# Patient Record
Sex: MALE | Race: WHITE | NOT HISPANIC OR LATINO | Employment: OTHER | URBAN - METROPOLITAN AREA
[De-identification: names, ages, dates, MRNs, and addresses within clinical notes are randomized per-mention and may not be internally consistent; named-entity substitution may affect disease eponyms.]

---

## 2023-08-31 ENCOUNTER — LAB (OUTPATIENT)
Dept: LAB | Facility: LAB | Age: 73
End: 2023-08-31
Payer: MEDICARE

## 2023-08-31 LAB
ALANINE AMINOTRANSFERASE (SGPT) (U/L) IN SER/PLAS: 10 U/L (ref 10–52)
ALBUMIN (G/DL) IN SER/PLAS: 4 G/DL (ref 3.4–5)
ALKALINE PHOSPHATASE (U/L) IN SER/PLAS: 56 U/L (ref 33–136)
ANION GAP IN SER/PLAS: 13 MMOL/L (ref 10–20)
APPEARANCE, URINE: CLEAR
ASPARTATE AMINOTRANSFERASE (SGOT) (U/L) IN SER/PLAS: 18 U/L (ref 9–39)
BASOPHILS (10*3/UL) IN BLOOD BY AUTOMATED COUNT: 0.04 X10E9/L (ref 0–0.1)
BASOPHILS/100 LEUKOCYTES IN BLOOD BY AUTOMATED COUNT: 0.7 % (ref 0–2)
BILIRUBIN TOTAL (MG/DL) IN SER/PLAS: 1.2 MG/DL (ref 0–1.2)
BILIRUBIN, URINE: NEGATIVE
BLOOD, URINE: NEGATIVE
C. DIFFICILE TOXIN, PCR: NOT DETECTED
CALCIUM (MG/DL) IN SER/PLAS: 9.3 MG/DL (ref 8.6–10.6)
CARBON DIOXIDE, TOTAL (MMOL/L) IN SER/PLAS: 25 MMOL/L (ref 21–32)
CHLORIDE (MMOL/L) IN SER/PLAS: 105 MMOL/L (ref 98–107)
CHOLESTEROL (MG/DL) IN SER/PLAS: 202 MG/DL (ref 0–199)
CHOLESTEROL IN HDL (MG/DL) IN SER/PLAS: 59.1 MG/DL
CHOLESTEROL/HDL RATIO: 3.4
COLOR, URINE: YELLOW
CREATININE (MG/DL) IN SER/PLAS: 0.78 MG/DL (ref 0.5–1.3)
EOSINOPHILS (10*3/UL) IN BLOOD BY AUTOMATED COUNT: 0.27 X10E9/L (ref 0–0.4)
EOSINOPHILS/100 LEUKOCYTES IN BLOOD BY AUTOMATED COUNT: 4.7 % (ref 0–6)
ERYTHROCYTE DISTRIBUTION WIDTH (RATIO) BY AUTOMATED COUNT: 13.2 % (ref 11.5–14.5)
ERYTHROCYTE MEAN CORPUSCULAR HEMOGLOBIN CONCENTRATION (G/DL) BY AUTOMATED: 34.7 G/DL (ref 32–36)
ERYTHROCYTE MEAN CORPUSCULAR VOLUME (FL) BY AUTOMATED COUNT: 93 FL (ref 80–100)
ERYTHROCYTES (10*6/UL) IN BLOOD BY AUTOMATED COUNT: 4.8 X10E12/L (ref 4.5–5.9)
GFR MALE: >90 ML/MIN/1.73M2
GLUCOSE (MG/DL) IN SER/PLAS: 88 MG/DL (ref 74–99)
GLUCOSE, URINE: NEGATIVE MG/DL
HEMATOCRIT (%) IN BLOOD BY AUTOMATED COUNT: 44.7 % (ref 41–52)
HEMOGLOBIN (G/DL) IN BLOOD: 15.5 G/DL (ref 13.5–17.5)
IMMATURE GRANULOCYTES/100 LEUKOCYTES IN BLOOD BY AUTOMATED COUNT: 0.2 % (ref 0–0.9)
KETONES, URINE: NEGATIVE MG/DL
LDL: 123 MG/DL (ref 0–99)
LEUKOCYTE ESTERASE, URINE: NEGATIVE
LEUKOCYTES (10*3/UL) IN BLOOD BY AUTOMATED COUNT: 5.8 X10E9/L (ref 4.4–11.3)
LYMPHOCYTES (10*3/UL) IN BLOOD BY AUTOMATED COUNT: 1.26 X10E9/L (ref 0.8–3)
LYMPHOCYTES/100 LEUKOCYTES IN BLOOD BY AUTOMATED COUNT: 21.9 % (ref 13–44)
MONOCYTES (10*3/UL) IN BLOOD BY AUTOMATED COUNT: 0.5 X10E9/L (ref 0.05–0.8)
MONOCYTES/100 LEUKOCYTES IN BLOOD BY AUTOMATED COUNT: 8.7 % (ref 2–10)
NEUTROPHILS (10*3/UL) IN BLOOD BY AUTOMATED COUNT: 3.67 X10E9/L (ref 1.6–5.5)
NEUTROPHILS/100 LEUKOCYTES IN BLOOD BY AUTOMATED COUNT: 63.8 % (ref 40–80)
NITRITE, URINE: NEGATIVE
NRBC (PER 100 WBCS) BY AUTOMATED COUNT: 0 /100 WBC (ref 0–0)
PH, URINE: 7 (ref 5–8)
PLATELETS (10*3/UL) IN BLOOD AUTOMATED COUNT: 231 X10E9/L (ref 150–450)
POTASSIUM (MMOL/L) IN SER/PLAS: 4.3 MMOL/L (ref 3.5–5.3)
PROSTATE SPECIFIC ANTIGEN,SCREEN: <0.1 NG/ML (ref 0–4)
PROTEIN TOTAL: 6.6 G/DL (ref 6.4–8.2)
PROTEIN, URINE: NEGATIVE MG/DL
SODIUM (MMOL/L) IN SER/PLAS: 139 MMOL/L (ref 136–145)
SPECIFIC GRAVITY, URINE: 1.01 (ref 1–1.03)
TRIGLYCERIDE (MG/DL) IN SER/PLAS: 102 MG/DL (ref 0–149)
UREA NITROGEN (MG/DL) IN SER/PLAS: 13 MG/DL (ref 6–23)
UROBILINOGEN, URINE: <2 MG/DL (ref 0–1.9)
VLDL: 20 MG/DL (ref 0–40)

## 2023-10-09 ENCOUNTER — TELEPHONE (OUTPATIENT)
Dept: PRIMARY CARE | Facility: CLINIC | Age: 73
End: 2023-10-09
Payer: MEDICARE

## 2024-01-09 DIAGNOSIS — B35.3 TINEA PEDIS OF BOTH FEET: Primary | ICD-10-CM

## 2024-01-09 RX ORDER — TRIAMCINOLONE ACETONIDE 1 MG/G
CREAM TOPICAL
COMMUNITY
Start: 2020-10-19

## 2024-01-09 RX ORDER — FLUOROURACIL 50 MG/G
CREAM TOPICAL
COMMUNITY
Start: 2020-10-19

## 2024-01-09 RX ORDER — HYDROCODONE BITARTRATE AND HOMATROPINE METHYLBROMIDE ORAL SOLUTION 5; 1.5 MG/5ML; MG/5ML
5 LIQUID ORAL EVERY 4 HOURS PRN
COMMUNITY
Start: 2014-12-20

## 2024-01-09 RX ORDER — AMOXICILLIN 500 MG/1
CAPSULE ORAL
COMMUNITY
Start: 2023-08-16

## 2024-01-09 RX ORDER — IBUPROFEN 800 MG/1
TABLET ORAL
COMMUNITY
Start: 2023-08-16

## 2024-01-16 ENCOUNTER — OFFICE VISIT (OUTPATIENT)
Dept: PRIMARY CARE | Facility: CLINIC | Age: 74
End: 2024-01-16
Payer: MEDICARE

## 2024-01-16 ENCOUNTER — HOSPITAL ENCOUNTER (OUTPATIENT)
Dept: RADIOLOGY | Facility: HOSPITAL | Age: 74
Discharge: HOME | End: 2024-01-16
Payer: MEDICARE

## 2024-01-16 VITALS
BODY MASS INDEX: 23.95 KG/M2 | WEIGHT: 158 LBS | HEIGHT: 68 IN | TEMPERATURE: 96.3 F | DIASTOLIC BLOOD PRESSURE: 76 MMHG | SYSTOLIC BLOOD PRESSURE: 126 MMHG | OXYGEN SATURATION: 98 %

## 2024-01-16 DIAGNOSIS — C61 MALIGNANT NEOPLASM OF PROSTATE (MULTI): ICD-10-CM

## 2024-01-16 DIAGNOSIS — M54.31 SCIATICA OF RIGHT SIDE: ICD-10-CM

## 2024-01-16 DIAGNOSIS — Z00.00 WELL ADULT EXAM: ICD-10-CM

## 2024-01-16 DIAGNOSIS — F33.0 MAJOR DEPRESSIVE DISORDER, RECURRENT EPISODE, MILD (CMS-HCC): Primary | ICD-10-CM

## 2024-01-16 PROCEDURE — 1036F TOBACCO NON-USER: CPT | Performed by: INTERNAL MEDICINE

## 2024-01-16 PROCEDURE — 99213 OFFICE O/P EST LOW 20 MIN: CPT | Performed by: INTERNAL MEDICINE

## 2024-01-16 PROCEDURE — 72110 X-RAY EXAM L-2 SPINE 4/>VWS: CPT

## 2024-01-16 PROCEDURE — 72110 X-RAY EXAM L-2 SPINE 4/>VWS: CPT | Performed by: RADIOLOGY

## 2024-01-16 RX ORDER — METHYLPREDNISOLONE 4 MG/1
TABLET ORAL
Qty: 21 TABLET | Refills: 0 | Status: SHIPPED | OUTPATIENT
Start: 2024-01-16 | End: 2024-01-23

## 2024-01-16 ASSESSMENT — ENCOUNTER SYMPTOMS
LOSS OF SENSATION IN FEET: 0
LEG PAIN: 1
OCCASIONAL FEELINGS OF UNSTEADINESS: 0
DEPRESSION: 0

## 2024-01-16 ASSESSMENT — PATIENT HEALTH QUESTIONNAIRE - PHQ9
2. FEELING DOWN, DEPRESSED OR HOPELESS: NOT AT ALL
1. LITTLE INTEREST OR PLEASURE IN DOING THINGS: NOT AT ALL
SUM OF ALL RESPONSES TO PHQ9 QUESTIONS 1 AND 2: 0

## 2024-01-16 NOTE — PROGRESS NOTES
"Subjective   Patient ID: Amaury Sawyer is a 73 y.o. male who presents for Leg Pain (Right).    Leg Pain      74 yo wm c/o pain right lowe back,buttock and leg since 11/23 after driving back  from [place]    Patient exercises lifetime fitness yoga also uses a sun and swimming pool  Review of Systems   Genitourinary:         Prostate cancer followed by me   Patient complaining of toenail fungus right great toe using Lamisil cream we went over treatment options including Lamisil pills there are complications we will continue to use the cream we also talked briefly about laser therapy    Objective   /76 (BP Location: Right arm, Patient Position: Sitting)   Temp 35.7 °C (96.3 °F)   Ht 1.727 m (5' 8\")   Wt 71.7 kg (158 lb)   SpO2 98%   BMI 24.02 kg/m²     Physical Exam  Constitutional:       Appearance: He is normal weight.   Cardiovascular:      Pulses: Normal pulses.   Skin:     Comments: Thickening and discoloration right great toenail   Neurological:      Sensory: No sensory deficit.      Motor: Weakness (Normal walking on toes and heels) present.      Coordination: Coordination normal.      Gait: Gait normal.      Deep Tendon Reflexes: Reflexes normal.         Assessment/Plan   Diagnoses and all orders for this visit:  Major depressive disorder, recurrent episode, mild (CMS/HCC)  Malignant neoplasm of prostate (CMS/HCC)    PSA up-to-date    Sciatica of right side  -     XR lumbar spine 2-3 views; Future  -     methylPREDNISolone (Medrol Dospak) 4 mg tablets; Take as directed on package.  -     Referral to Physical Therapy; Future  Well adult exam  -     Colonoscopy Screening; Average Risk Patient; Future  Onychomycosis right great toenail continue with your topical Lamisil although I do not think it is going to help very much.  We talked about treatment options    Follow-up with me 6 weeks       "

## 2024-02-01 NOTE — PROGRESS NOTES
Physical Therapy  Physical Therapy Orthopedic Evaluation    Patient Name: Amaury Sawyer  MRN: [id number]  Today's Date: 2/5/2024  Time Calculation  Start Time: 0745  Stop Time: 0830  Time Calculation (min): 45 min    Referring Physician: Dr. Ben Amezquita  Visit #: 1 of MN  Medicare cert dates: 2/5/24-5/5/24  Insurance: [facility], auth needed, $20 copay    Current Problem  1. Lumbar radiculopathy, right  Follow Up In Physical Therapy      2. Sciatica of right side  Referral to Physical Therapy          Medical history form reviewed: Yes  DOI: 11/20/23    Subjective:   Patient with complaint of Right LBP with referred pain to buttock and R LE. Noticed pain after a long car ride from [employer]. Treated with steroid pack  in January with good results. About one week ago, lifting a chair and pain returned for a quick moment.  Currently: min. Ache R glut with no radiation         Precautions  STEADI Fall Risk Score (The score of 4 or more indicates an increased risk of falling): 0  Precautions Comment: Hx of cancer, no electrical modalities  Pain:  Pain Score: 2    Pain Exacerbating Factors: sometimes with driving, prolonged sitting    Pain Relieving Factors: move around, stand up    Imaging completed: X-ray: DJD lumbar spine    Exercise: yoga, swim ([employer])    Patient Goals for Treatment: decrease pain    Work Status: stained glass studio: some lifting, standing  Current status (improving, unchanged, worsening): improving    Current Level of Function: 90%    Patient aware of diagnosis and prognosis: Yes    Living Environment: apt.  Stairs: 1 floor(15 steps)  Social Support: Lives with lives alone    Language: English  Medical History Form: Reviewed (scanned into chart)          Objective:  Posture: unremarkable  Palpation: no tenderness to palpation lumbar spine  Gait: normal  Balance: normal  Trendelenburg: negative  L-AROM: WNL with no radiation  Repeat flexion/extension: no change  LE Strength: WNL B  Core  strength: 4  Sensation: intact  Slump test: negative  LE Flexibility: min tight B hamstring, piriformis  Kg test: + tight, no pain  Scour test: negative  Lumbar spinal mobility: WNL        Outcome Measures:  Other Measures  Oswestry Disablity Index (JASBIR): 13/50             EDUCATION: home exercise program, plan of care, activity modifications, pain management, and injury pathology       Goals:  Active       PT Problem       STG       Start:  02/05/24    Expected End:  05/05/24       STG's to be achieved in 4 weeks    1. Decrease R back/glut pain 50% with activity  2. Decrease  Oswestry by 3 points to help improve ADL's  3. Increase hip/core strength 1/2 muscle grade to help improve endurance  4. Improve flexibility hamstring/piriformis to assist with ADL's  5. Demonstrate proper posture with exercise           LTG       Start:  02/05/24    Expected End:  05/05/24       LTG's to be achieved in 8 weeks    1. Decrease R back/glut pain to tolerable with activity  2. Decrease Oswestry by 6 points to help improve ADL's  3. Increase strength hip/core  1 muscle grade to help improve endurance  4. Patient able to sit 1 hour with 50% less pain  5. Patient to be independent in daily HEP                      Treatments:   Patient instructed in HEP.   Access Code: [email or web address]  URL: [email or web address]  Date: 02/05/2024  Prepared by: Zeina Carroll    Exercises  - piriformis stretch seated  - 2-3 x daily - 7 x weekly - 2 reps - 20 hold  - hamstring stretch standing  - 2-3 x daily - 7 x weekly - 3 sets - 2 reps - 20 hold  Patient provided with written HEP.      Assessment: Patient is a 73 y.o. y/o male  with complaint of R back pain with occasional radicular pain R LE. Patient presents with min. Tightness LE and weak core. Pt would benefit from physical therapy to address the impairments found & listed previously in the objective section in order to return to safe and pain-free ADLs and prior level of  function.       Plan:   Rehab Potential: Good  Plan of Care Agreement: Patient  Planned Interventions include: therapeutic exercise, self-care home management, manual therapy, therapeutic activities, gait training, neuromuscular coordination  Frequency: 1x/wk  Duration: 8wks    Charges: eval-low, 1 TE      Zeina Carroll, PT, OCS

## 2024-02-04 ENCOUNTER — HOSPITAL ENCOUNTER (EMERGENCY)
Facility: HOSPITAL | Age: 74
Discharge: HOME | End: 2024-02-04
Attending: GENERAL PRACTICE
Payer: MEDICARE

## 2024-02-04 ENCOUNTER — APPOINTMENT (OUTPATIENT)
Dept: RADIOLOGY | Facility: HOSPITAL | Age: 74
End: 2024-02-04
Payer: MEDICARE

## 2024-02-04 ENCOUNTER — APPOINTMENT (OUTPATIENT)
Dept: CARDIOLOGY | Facility: HOSPITAL | Age: 74
End: 2024-02-04
Payer: MEDICARE

## 2024-02-04 VITALS
SYSTOLIC BLOOD PRESSURE: 159 MMHG | HEART RATE: 61 BPM | TEMPERATURE: 98.8 F | DIASTOLIC BLOOD PRESSURE: 56 MMHG | OXYGEN SATURATION: 98 % | BODY MASS INDEX: 23.95 KG/M2 | HEIGHT: 68 IN | WEIGHT: 158 LBS | RESPIRATION RATE: 17 BRPM

## 2024-02-04 DIAGNOSIS — M54.12 CERVICAL RADICULOPATHY: ICD-10-CM

## 2024-02-04 DIAGNOSIS — I71.21 ANEURYSM OF ASCENDING AORTA WITHOUT RUPTURE (CMS-HCC): Primary | ICD-10-CM

## 2024-02-04 LAB
ALBUMIN SERPL BCP-MCNC: 3.6 G/DL (ref 3.4–5)
ALP SERPL-CCNC: 55 U/L (ref 33–136)
ALT SERPL W P-5'-P-CCNC: 9 U/L (ref 10–52)
ANION GAP SERPL CALC-SCNC: 12 MMOL/L (ref 10–20)
AST SERPL W P-5'-P-CCNC: 18 U/L (ref 9–39)
BASOPHILS # BLD AUTO: 0.04 X10*3/UL (ref 0–0.1)
BASOPHILS NFR BLD AUTO: 0.5 %
BILIRUB SERPL-MCNC: 0.8 MG/DL (ref 0–1.2)
BUN SERPL-MCNC: 18 MG/DL (ref 6–23)
CA-I BLD-SCNC: 1.14 MMOL/L (ref 1.1–1.33)
CALCIUM SERPL-MCNC: 8.6 MG/DL (ref 8.6–10.3)
CARDIAC TROPONIN I PNL SERPL HS: 22 NG/L (ref 0–20)
CARDIAC TROPONIN I PNL SERPL HS: 23 NG/L (ref 0–20)
CHLORIDE SERPL-SCNC: 105 MMOL/L (ref 98–107)
CO2 SERPL-SCNC: 23 MMOL/L (ref 21–32)
CREAT SERPL-MCNC: 0.83 MG/DL (ref 0.5–1.3)
EGFRCR SERPLBLD CKD-EPI 2021: >90 ML/MIN/1.73M*2
EOSINOPHIL # BLD AUTO: 0.15 X10*3/UL (ref 0–0.4)
EOSINOPHIL NFR BLD AUTO: 1.8 %
ERYTHROCYTE [DISTWIDTH] IN BLOOD BY AUTOMATED COUNT: 12.9 % (ref 11.5–14.5)
GLUCOSE SERPL-MCNC: 129 MG/DL (ref 74–99)
HCT VFR BLD AUTO: 43.1 % (ref 41–52)
HGB BLD-MCNC: 14.7 G/DL (ref 13.5–17.5)
HOLD SPECIMEN: NORMAL
IMM GRANULOCYTES # BLD AUTO: 0.02 X10*3/UL (ref 0–0.5)
IMM GRANULOCYTES NFR BLD AUTO: 0.2 % (ref 0–0.9)
LYMPHOCYTES # BLD AUTO: 1.23 X10*3/UL (ref 0.8–3)
LYMPHOCYTES NFR BLD AUTO: 14.7 %
MAGNESIUM SERPL-MCNC: 2 MG/DL (ref 1.6–2.4)
MCH RBC QN AUTO: 31.3 PG (ref 26–34)
MCHC RBC AUTO-ENTMCNC: 34.1 G/DL (ref 32–36)
MCV RBC AUTO: 92 FL (ref 80–100)
MONOCYTES # BLD AUTO: 0.51 X10*3/UL (ref 0.05–0.8)
MONOCYTES NFR BLD AUTO: 6.1 %
NEUTROPHILS # BLD AUTO: 6.4 X10*3/UL (ref 1.6–5.5)
NEUTROPHILS NFR BLD AUTO: 76.7 %
NRBC BLD-RTO: 0 /100 WBCS (ref 0–0)
PLATELET # BLD AUTO: 202 X10*3/UL (ref 150–450)
POTASSIUM SERPL-SCNC: 3.9 MMOL/L (ref 3.5–5.3)
PROT SERPL-MCNC: 6.1 G/DL (ref 6.4–8.2)
RBC # BLD AUTO: 4.7 X10*6/UL (ref 4.5–5.9)
SODIUM SERPL-SCNC: 136 MMOL/L (ref 136–145)
WBC # BLD AUTO: 8.4 X10*3/UL (ref 4.4–11.3)

## 2024-02-04 PROCEDURE — 70450 CT HEAD/BRAIN W/O DYE: CPT | Performed by: RADIOLOGY

## 2024-02-04 PROCEDURE — 82330 ASSAY OF CALCIUM: CPT | Performed by: STUDENT IN AN ORGANIZED HEALTH CARE EDUCATION/TRAINING PROGRAM

## 2024-02-04 PROCEDURE — 72125 CT NECK SPINE W/O DYE: CPT | Performed by: RADIOLOGY

## 2024-02-04 PROCEDURE — 2550000001 HC RX 255 CONTRASTS: Performed by: GENERAL PRACTICE

## 2024-02-04 PROCEDURE — 83735 ASSAY OF MAGNESIUM: CPT | Performed by: STUDENT IN AN ORGANIZED HEALTH CARE EDUCATION/TRAINING PROGRAM

## 2024-02-04 PROCEDURE — 70450 CT HEAD/BRAIN W/O DYE: CPT

## 2024-02-04 PROCEDURE — 85025 COMPLETE CBC W/AUTO DIFF WBC: CPT | Performed by: GENERAL PRACTICE

## 2024-02-04 PROCEDURE — 84484 ASSAY OF TROPONIN QUANT: CPT | Performed by: GENERAL PRACTICE

## 2024-02-04 PROCEDURE — 93005 ELECTROCARDIOGRAM TRACING: CPT

## 2024-02-04 PROCEDURE — 71046 X-RAY EXAM CHEST 2 VIEWS: CPT

## 2024-02-04 PROCEDURE — 36415 COLL VENOUS BLD VENIPUNCTURE: CPT | Performed by: GENERAL PRACTICE

## 2024-02-04 PROCEDURE — 71275 CT ANGIOGRAPHY CHEST: CPT

## 2024-02-04 PROCEDURE — 36415 COLL VENOUS BLD VENIPUNCTURE: CPT | Performed by: EMERGENCY MEDICINE

## 2024-02-04 PROCEDURE — 72125 CT NECK SPINE W/O DYE: CPT

## 2024-02-04 PROCEDURE — 71046 X-RAY EXAM CHEST 2 VIEWS: CPT | Performed by: STUDENT IN AN ORGANIZED HEALTH CARE EDUCATION/TRAINING PROGRAM

## 2024-02-04 PROCEDURE — 80053 COMPREHEN METABOLIC PANEL: CPT | Performed by: GENERAL PRACTICE

## 2024-02-04 PROCEDURE — 85025 COMPLETE CBC W/AUTO DIFF WBC: CPT | Performed by: EMERGENCY MEDICINE

## 2024-02-04 PROCEDURE — 99285 EMERGENCY DEPT VISIT HI MDM: CPT | Mod: 25 | Performed by: GENERAL PRACTICE

## 2024-02-04 PROCEDURE — 71275 CT ANGIOGRAPHY CHEST: CPT | Performed by: RADIOLOGY

## 2024-02-04 RX ADMIN — IOHEXOL 75 ML: 350 INJECTION, SOLUTION INTRAVENOUS at 17:30

## 2024-02-04 ASSESSMENT — COLUMBIA-SUICIDE SEVERITY RATING SCALE - C-SSRS
2. HAVE YOU ACTUALLY HAD ANY THOUGHTS OF KILLING YOURSELF?: NO
6. HAVE YOU EVER DONE ANYTHING, STARTED TO DO ANYTHING, OR PREPARED TO DO ANYTHING TO END YOUR LIFE?: NO
1. IN THE PAST MONTH, HAVE YOU WISHED YOU WERE DEAD OR WISHED YOU COULD GO TO SLEEP AND NOT WAKE UP?: NO

## 2024-02-04 ASSESSMENT — PAIN DESCRIPTION - DESCRIPTORS: DESCRIPTORS: DULL

## 2024-02-04 ASSESSMENT — PAIN SCALES - GENERAL: PAINLEVEL_OUTOF10: 4

## 2024-02-04 NOTE — ED PROVIDER NOTES
HPI:  Amaury Sawyer is a 73 y.o. with a history of HLD, presents emergency department complaining of right upper extremity numbness that started this morning, endorsing that it started in the tips of his fingers, rating up his arm.  Also endorse that he has been having neck pain, soreness since yesterday as well as a posterior headache.  He denies any recent trauma, nausea, vomiting.  States that at one point his numbness did radiate up to his right chest wall.,  Currently denies any chest pain, shortness of breath, recent fevers or chills.  Does state he has had a right shoulder replacement in the past, has had arthritis and intermittent numbness in his arm previously.  He denies any dizziness though does state he felt slightly lightheaded earlier today which is now resolved.     ------------------------------------------------------------------------------------------------------------------------------------------    Physical Exam:    ED Triage Vitals [02/04/24 1256]   Temperature Heart Rate Respirations BP   37.1 °C (98.8 °F) 64 16 112/75      Pulse Ox Temp Source Heart Rate Source Patient Position   96 % Temporal -- --      BP Location FiO2 (%)     -- --       Gen: Alert, comfortable appearing.  Sitting up in bed, in no acute distress.  Nontoxic.  Head/Neck: NCAT, neck w/ FROM.  Patient does have reproducible tingling in the right upper extremity with axial loading.  No midline C-spine tenderness, step-offs or deformities.  Eyes: EOMI, PERRL, anicteric sclerae, noninjected conjunctivae  Nose: Nares patent w/o rhinorrhea  Mouth:  MMM, no OP lesions noted  Heart: RRR, well perfused.  No anterior chest wall tenderness to palpation.  No overlying skin changes.  Lungs: CTA b/l no RRW, no increased work of breathing  Abdomen: soft, NT, ND, no rebound guarding or rigidity  Extremities: Warm, well perfused. Compartments soft, nontender to palpation.  Full range of motion.  Patient does have improvement in  paresthesias of the right upper extremity when abducting arm resting hand on head.   Neurologic: A&O x3, following all commands. CN II-XII intact. Diminished sensation along R C7 distribution, remaining upper and lower extremity strength and sensation intact.   Skin: warm, dry   Psychological: calm     ------------------------------------------------------------------------------------------------------------------------------------------    Medical Decision Making  73-year-old male with past medical history of hyperlipidemia, presenting to the emergency department complaining of right upper extremity paresthesias, headache and transient chest pain.  Vital signs are stable, patient is nontoxic.  Exam is as above.  Differential includes cervical radiculopathy versus dissection versus TIA/stroke versus electrolyte abnormality versus ACS.  Labs show no leukocytosis, CMP shows slightly elevated glucose, otherwise unremarkable, magnesium within normal limits, initial troponin 22, delta 23, ionized calcium within normal limits. Chest x-ray shows no acute pathology.  CT head, C-spine and CTA of the chest are pending. EKG, interpreted by me, shows normal sinus rhythm at 61 bpm, normal axis, no ST elevations or depressions, intervals are within normal limits, no STEMI.  Signed out to incoming team pending CTs, reevaluation.      Diagnoses as of 02/05/24 1426   Aneurysm of ascending aorta without rupture (CMS/HCC)   Cervical radiculopathy        Procedures       Clinical Impression: paresthesias of RUE     Dispo: pending upon signout to incoming team      Discussed with ED Attending, Dr. Fredrick Mims,    Emergency Medicine, PGY3      Azeem Mims,   Resident  02/04/24 1805       Azeem Mims DO  Resident  02/05/24 1426

## 2024-02-04 NOTE — ED TRIAGE NOTES
Pt coming in today because he eating breakfast where he started to feel numbness and tingling on the right shoulder the radiate towards the chest. Since then he feels better and not complaining of the same sensation. Pain started roughly 45 mins ago. Personally has not cardiac history but has a family history of stroke and heart attacks. Pt has no deficits and able to move all extremities equally.

## 2024-02-05 ENCOUNTER — EVALUATION (OUTPATIENT)
Dept: PHYSICAL THERAPY | Facility: CLINIC | Age: 74
End: 2024-02-05
Payer: MEDICARE

## 2024-02-05 DIAGNOSIS — M54.31 SCIATICA OF RIGHT SIDE: ICD-10-CM

## 2024-02-05 DIAGNOSIS — M54.16 LUMBAR RADICULOPATHY, RIGHT: Primary | ICD-10-CM

## 2024-02-05 PROCEDURE — 97110 THERAPEUTIC EXERCISES: CPT | Mod: GP

## 2024-02-05 PROCEDURE — 97161 PT EVAL LOW COMPLEX 20 MIN: CPT | Mod: GP

## 2024-02-05 ASSESSMENT — PAIN SCALES - GENERAL: PAINLEVEL_OUTOF10: 2

## 2024-02-05 ASSESSMENT — ENCOUNTER SYMPTOMS
DEPRESSION: 0
LOSS OF SENSATION IN FEET: 0
OCCASIONAL FEELINGS OF UNSTEADINESS: 0

## 2024-02-05 NOTE — DISCHARGE INSTRUCTIONS
You were seen in our hospital today and found with a incidental ascending aortic aneurysm.  We consulted with cardiac surgery on your behalf to coordinate outpatient follow-up.  Cardiac surgery hosts a clinic here at [facility] every Monday and we will try to get you scheduled for clinic next Monday.  Please be on the look out for a phone call or email from [facility].

## 2024-02-05 NOTE — PROGRESS NOTES
Patient was handed off to me from the previous team. For full history, physical, and prior ED course, please see previous provider note prior to patient handoff. This is an addendum to the record.    Briefly, this is a 73-year-old male with hyperlipidemia presenting to the emergency department with right upper extremity paresthesias headache and transient chest pain.  At time of signout, patient was pending CT imaging.  Troponin stable in the low 20s.  EKG nonischemic.  CT angio study shows a 4.7 cm aneurysm of the ascending aorta.  Appears stable on CT however given this incidental finding, cardiothoracic surgery was consulted and will coordinate outpatient follow-up for the cardiothoracic clinic here at [facility] on Mondays as no surgical intervention is indicated until the aneurysm is greater than 5 cm.  Patient remains hemodynamically stable and in no acute distress.  Discussed outpatient plan with patient as well as plan to follow-up with cardiac surgery.  Patient is agreeable.  Do not feel that further workup indicated at this time. Discussed results, diagnosis/differential, and plan with patient. Patient advised to follow up with primary physician in 2-3 days. Discussed return precautions and encouraged patient to return to the Emergency Department for any concerning symptoms or worsening condition. Patient expresses understanding and is in agreement. All questions answered. Patient discharged in stable condition.      Throughout the ED stay, the patient was monitored and re-examined for any changes in stability or symptomatology. The patient was instructed to return to the ED if any symptoms recurred, worsened, or if there was any additional concerns.    Pt seen and discussed with Dr. Fredrick Morataya DO.  Emergency Medicine PGY-2

## 2024-02-06 LAB
ATRIAL RATE: 61 BPM
P AXIS: 55 DEGREES
P OFFSET: 197 MS
P ONSET: 145 MS
PR INTERVAL: 152 MS
Q ONSET: 221 MS
QRS COUNT: 10 BEATS
QRS DURATION: 90 MS
QT INTERVAL: 416 MS
QTC CALCULATION(BAZETT): 418 MS
QTC FREDERICIA: 418 MS
R AXIS: 43 DEGREES
T AXIS: 48 DEGREES
T OFFSET: 429 MS
VENTRICULAR RATE: 61 BPM

## 2024-02-11 NOTE — PROGRESS NOTES
Physical Therapy  Physical Therapy Treatment    Patient Name: Amaury Sawyer  MRN: [id number]  Today's Date: 2/13/2024  Time Calculation  Start Time: 1630  Stop Time: 1715  Time Calculation (min): 45 min    Referring Physician: Dr. Ben Amezquita  Visit #: 2 of MN  Medicare cert dates: 2/5/24-5/5/24  Insurance: [facility], auth needed, $20 copay      Current Problem  1. Lumbar radiculopathy, right               Precautions  Precautions  Precautions Comment: Hx of cancer, no electrical modalities    Pain Score: 0 - No pain  Performing HEP: Yes      Subjective   Patient reports no symptoms today.      Objective   Gait: normal      Treatment:      Bike x5 min. L5  Passive stretching B hip flexor, hamstring, piriformis, TR  Supine GB heel slide x1'  Supine GB bridge x2'  Supine GB reverse curl x20  Green TB hip ER x2'  Green TB clam x1' R/L  GB seated march x1'  GB seated LAQ x1'  GB crunch 2x10  GB sit to bridge x5  GB swimmers x10 R/L  GB push up x5      Access Code: [email or web address]   Assessment:   Patient did very well with exercise. Patient able to make small adjustments when any discomfort. Improved flexibility R LE.       Plan:   Assess symptoms next session and make changes as needed. Progress with core stability.         Charges: 2 ex, 1 NME      Zeina Carroll, PT

## 2024-02-13 ENCOUNTER — TREATMENT (OUTPATIENT)
Dept: PHYSICAL THERAPY | Facility: CLINIC | Age: 74
End: 2024-02-13
Payer: MEDICARE

## 2024-02-13 DIAGNOSIS — M54.16 LUMBAR RADICULOPATHY, RIGHT: Primary | ICD-10-CM

## 2024-02-13 PROCEDURE — 97112 NEUROMUSCULAR REEDUCATION: CPT | Mod: GP

## 2024-02-13 PROCEDURE — 97110 THERAPEUTIC EXERCISES: CPT | Mod: GP

## 2024-02-13 ASSESSMENT — PAIN SCALES - GENERAL: PAINLEVEL_OUTOF10: 0 - NO PAIN

## 2024-02-20 ENCOUNTER — APPOINTMENT (OUTPATIENT)
Dept: GASTROENTEROLOGY | Facility: HOSPITAL | Age: 74
End: 2024-02-20
Payer: MEDICARE

## 2024-02-21 ENCOUNTER — TREATMENT (OUTPATIENT)
Dept: PHYSICAL THERAPY | Facility: CLINIC | Age: 74
End: 2024-02-21
Payer: MEDICARE

## 2024-02-21 DIAGNOSIS — M54.16 LUMBAR RADICULOPATHY, RIGHT: Primary | ICD-10-CM

## 2024-02-21 PROCEDURE — 97112 NEUROMUSCULAR REEDUCATION: CPT | Mod: GP,CQ | Performed by: SPECIALIST/TECHNOLOGIST

## 2024-02-21 PROCEDURE — 97110 THERAPEUTIC EXERCISES: CPT | Mod: GP,CQ | Performed by: SPECIALIST/TECHNOLOGIST

## 2024-02-21 ASSESSMENT — PAIN SCALES - GENERAL: PAINLEVEL_OUTOF10: 0 - NO PAIN

## 2024-02-21 NOTE — PROGRESS NOTES
"Physical Therapy  Physical Therapy Treatment    Patient Name: Amaury Sawyer  MRN: [id number]  Today's Date: 2/21/2024  Time Calculation  Start Time: 1025  Stop Time: 1112  Time Calculation (min): 47 min    Referring Physician: Dr. Ben Amezquita  Visit #: 3 of MN  Medicare cert dates: 2/5/24-5/5/24  Insurance: [facility], auth needed, $20 copay      Current Problem  1. 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      Objective   Gait: normal  No innominate  Pirif 0°, Hip ext 1/2\" Quads 10\"   MMT hip ext 3, hams 4+     Treatment:      Stepper L2 5 min   DBE 2x2 min   4 kg KB tandem & swing cw/ccw 10x R/L   8 kg KB ISO SB R/L 1'   Bravo ISO rot R/L 1' 7.5#   Hip ext R/L 55#/55# 20x  Hams 30# 20x  SS hams R/L 1'   Rolling pin massage R/L quads   SS pirif, HF, Quads 1' (standing and supine)       Access Code: [email or web address]   Assessment:   Patient tolerated intensity with minimal fatigue noting challenge of balance and core work.  Patient noted feeling good post treatment.  Patient reports better understanding post treatment.   Plan:   Continue to improve core stability/strength, anterior flexibility and balance to improve gait and ADL  Patient to schedule 2-3 sessions before March 21 re-check with Zeina Carroll, PT, OCS        Charges: 2 ex, 1 NME      Chase Sanchez PTA  "

## 2024-03-08 ENCOUNTER — TREATMENT (OUTPATIENT)
Dept: PHYSICAL THERAPY | Facility: CLINIC | Age: 74
End: 2024-03-08
Payer: MEDICARE

## 2024-03-08 DIAGNOSIS — M54.16 LUMBAR RADICULOPATHY, RIGHT: ICD-10-CM

## 2024-03-08 PROCEDURE — 97110 THERAPEUTIC EXERCISES: CPT | Mod: GP,CQ | Performed by: SPECIALIST/TECHNOLOGIST

## 2024-03-08 PROCEDURE — 97112 NEUROMUSCULAR REEDUCATION: CPT | Mod: GP,CQ | Performed by: SPECIALIST/TECHNOLOGIST

## 2024-03-08 ASSESSMENT — PAIN SCALES - GENERAL: PAINLEVEL_OUTOF10: 0 - NO PAIN

## 2024-03-08 NOTE — PROGRESS NOTES
"Physical Therapy  Physical Therapy Treatment    Patient Name: Amaury Sawyer  MRN: [id number]  Today's Date: 3/8/2024  Time Calculation  Start Time: 0740  Stop Time: 0825  Time Calculation (min): 45 min    Referring Physician: Dr. Ben Amezquita  Visit #: 3 of MN Medicare cert dates: 2/5/24-5/5/24  Insurance: [facility], auth needed, $20 copay      Current Problem  1. Lumbar radiculopathy, right  Follow Up In Physical Therapy          Reason for Referral: R Lumbar Radiculopathy  Referred By: JOHANNA Amezquita MD  Precautions  Precautions  STEADI Fall Risk Score (The score of 4 or more indicates an increased risk of falling): 0  Precautions Comment: Hx of cancer, no electrical modalities    Pain Score: 0 - No pain  Performing HEP: Yes      Subjective   Patient reports leg symptoms driving eliminated by purchase of tush cush.  Patient no longer having nerve pain down legs.  Patient noted B ankle soreness for about a day and a half after last session. Patient notes some general joint aches.      Objective   Gait: normal  No innominate  Pirif 0°, Hip ext 1/2\" Quads 10\"   MMT hip ext 3, hams 4+     Treatment:      Stepper L2 5 min   DBE 2x2 min   4 kg KB tandem & swing cw/ccw 10x R/L   8 kg KB ISO SB R/L 1'   Bravo ISO rot R/L 1' 10#   25# band lumbar extension 20x  Hip ext R/L 66#/66# 20x  Hams 30# 20x  SS hams R/L 1'   Rolling pin massage R/L quads   SS pirif, HF, Quads 1' (standing and supine)       Access Code: [email or web address]   Assessment:   Patient tolerated increased intensity with minimal fatigue noting challenge of balance.  Patient noted no symptoms post treatment.  Patient noted good effort of seated lumbar extension.  Patient reports better understanding post treatment.   Plan:   Continue to improve core stability/strength, anterior flexibility and balance to improve gait and ADL  Patient has 1 further session prior to March 21 re-check with Zeina Carroll, PT, OCS        Charges: 2 ex, 1 NME      Chase Sanchez, " PTA

## 2024-03-13 ENCOUNTER — TREATMENT (OUTPATIENT)
Dept: PHYSICAL THERAPY | Facility: CLINIC | Age: 74
End: 2024-03-13
Payer: MEDICARE

## 2024-03-13 DIAGNOSIS — M54.16 LUMBAR RADICULOPATHY, RIGHT: ICD-10-CM

## 2024-03-13 PROCEDURE — 97112 NEUROMUSCULAR REEDUCATION: CPT | Mod: GP,CQ | Performed by: SPECIALIST/TECHNOLOGIST

## 2024-03-13 PROCEDURE — 97110 THERAPEUTIC EXERCISES: CPT | Mod: GP,CQ | Performed by: SPECIALIST/TECHNOLOGIST

## 2024-03-13 ASSESSMENT — PAIN SCALES - GENERAL: PAINLEVEL_OUTOF10: 0 - NO PAIN

## 2024-03-13 NOTE — PROGRESS NOTES
"Physical Therapy  Physical Therapy Treatment    Patient Name: Amaury Sawyer  MRN: [id number]  Today's Date: 3/13/2024  Time Calculation  Start Time: 0940  Stop Time: 1028  Time Calculation (min): 48 min    Referring Physician: Dr. Ben Amezquita  Visit #: 5 of MN  Medicare cert dates: 2/5/24-5/5/24  Insurance: [facility], auth needed, $20 copay      Current Problem  1. Lumbar radiculopathy, right  Follow Up In Physical Therapy          Reason for Referral: R Lumbar Radiculopathy  Referred By: JOHANNA Amezquita MD  Precautions  Precautions  STEADI Fall Risk Score (The score of 4 or more indicates an increased risk of falling): 0  Precautions Comment: Hx of cancer, no electrical modalities    Pain Score: 0 - No pain  Performing HEP: Yes      Subjective   Patient arrived full weight bearing noting no back pain on arrival.  Patient notes mild R knee pain on arrival.  Patient notes feeling much better overall without any soreness after last session.      Objective   Gait: normal  No innominate  Pirif 0°, Hip ext 1/2\" Quads 10\"   MMT hip ext 3, hams 4+     Treatment:      Stepper L2 5 min   DBE 2x2 min   4 kg KB tandem & swing cw/ccw 10x R/L   8 kg KB ISO SB R/L 1'   Bravo ISO rot R/L 1' 10#   25# band lumbar extension 20x  Hip ext R/L 77#/77# 20x  Hams 30# 2x20  SS hams R/L 1'   Rolling pin massage R/L quads   SS pirif, HF, Quads 1' (standing and supine)        Access Code: [email or web address]   Assessment:   Patient tolerated increased intensity without problems.  Patient noted good effort with seated lumbar extension.  Patient still challenged by balance.   Plan:   Continue to improve core stability/strength, anterior flexibility and balance to improve gait and ADL  Patient has re-check with Zeina Carroll, PT, OCS on March 21.       Charges: 2 ex, 1 NME      Chase Sanchez, PTA  "

## 2024-03-18 NOTE — PROGRESS NOTES
Physical Therapy  Physical Therapy Orthopedic Progress Note    Patient Name: Amaury Sawyer  MRN: [id number]  Today's Date: 3/21/2024  Time Calculation  Start Time: 0915  Stop Time: 1000  Time Calculation (min): 45 min    Referring Physician: Dr. Ben Amezquita  Visit #: 6 of MN  Medicare cert dates: 2/5/24-5/5/24  Insurance: [facility], auth needed, $20 copay      Current Problem  1. Lumbar radiculopathy, right  Follow Up In Physical Therapy             Precautions:  Precautions  Precautions Comment: Hx of cancer, no electrical modalities        Subjective   Patient reports min pain in buttock R with no radicular pain    Current Condition:   better     PAIN  Pain Score: 3    Self Reported Function (0-100%) = 90%      Objective   Core strength: 4+  LE Flexibility: min tight B hamstring, piriformis  Kg test: WNL      Outcome Measures: Updated 3/21/2024  Other Measures  Oswestry Disablity Index (JASBIR): 9/50           Treatments:    Nustep x8 min. L2   Re-assess goals  Review and perform HEP  25# band lumbar extension 20x  DBE 2x2 min   4 kg KB tandem & swing cw/ccw 15x R/L   8 kg KB farmers carry around room x1 R/L  Bravo trunk rotation 10# x10 R/L  Bravo lat pull down 20# 2x10     Access Code: [email or web address]     Assessment:   Patient has made improvements with core strength and flexibility. I feel patient is ready to continue on his own with HEP.     Goals: Updated 3/21/2024  Resolved       PT Problem       STG (Adequate for Discharge)       Start:  02/05/24    Expected End:  05/05/24    Resolved:  03/21/24    STG's to be achieved in 4 weeks    1. Decrease R back/glut pain 50% with activity- goal achieved  2. Decrease  Oswestry by 3 points to help improve ADL's- goal achieved  3. Increase hip/core strength 1/2 muscle grade to help improve endurance- goal achieved  4. Improve flexibility hamstring/piriformis to assist with ADL's- goal achieved  5. Demonstrate proper posture with exercise- goal achieved           LTG  (Adequate for Discharge)       Start:  02/05/24    Expected End:  05/05/24    Resolved:  03/21/24    LTG's to be achieved in 8 weeks    1. Decrease R back/glut pain to tolerable with activity- in progress  2. Decrease Oswestry by 6 points to help improve ADL's- in progress  3. Increase strength hip/core  1 muscle grade to help improve endurance- in progress  4. Patient able to sit 1 hour with 50% less pain- in progress  5. Patient to be independent in daily HEP - goal achieved               Plan of Care: Updated 3/21/2024   Discharge PT. Encourage continuation of HEP for best results.     Charges: 2 TE, 1 NME    Zeina Carroll, PT, OCS

## 2024-03-20 DIAGNOSIS — Z12.11 COLON CANCER SCREENING: Primary | ICD-10-CM

## 2024-03-20 RX ORDER — POLYETHYLENE GLYCOL 3350 17 G/17G
POWDER, FOR SOLUTION ORAL
Qty: 238 G | Refills: 0 | Status: SHIPPED | OUTPATIENT
Start: 2024-03-20 | End: 2024-03-26 | Stop reason: ALTCHOICE

## 2024-03-21 ENCOUNTER — TREATMENT (OUTPATIENT)
Dept: PHYSICAL THERAPY | Facility: CLINIC | Age: 74
End: 2024-03-21
Payer: MEDICARE

## 2024-03-21 DIAGNOSIS — M54.16 LUMBAR RADICULOPATHY, RIGHT: ICD-10-CM

## 2024-03-21 PROCEDURE — 97110 THERAPEUTIC EXERCISES: CPT | Mod: GP

## 2024-03-21 PROCEDURE — 97112 NEUROMUSCULAR REEDUCATION: CPT | Mod: GP

## 2024-03-21 ASSESSMENT — PAIN SCALES - GENERAL: PAINLEVEL_OUTOF10: 3

## 2024-03-25 ENCOUNTER — HOSPITAL ENCOUNTER (OUTPATIENT)
Dept: GASTROENTEROLOGY | Facility: HOSPITAL | Age: 74
Setting detail: OUTPATIENT SURGERY
Discharge: HOME | End: 2024-03-25
Payer: MEDICARE

## 2024-03-25 VITALS
SYSTOLIC BLOOD PRESSURE: 111 MMHG | OXYGEN SATURATION: 96 % | HEART RATE: 58 BPM | RESPIRATION RATE: 16 BRPM | TEMPERATURE: 96.8 F | DIASTOLIC BLOOD PRESSURE: 81 MMHG

## 2024-03-25 DIAGNOSIS — Z00.00 WELL ADULT EXAM: Primary | ICD-10-CM

## 2024-03-25 DIAGNOSIS — Z86.010 HISTORY OF COLON POLYPS: ICD-10-CM

## 2024-03-25 PROCEDURE — G0500 MOD SEDAT ENDO SERVICE >5YRS: HCPCS | Performed by: INTERNAL MEDICINE

## 2024-03-25 PROCEDURE — 7100000010 HC PHASE TWO TIME - EACH INCREMENTAL 1 MINUTE: Performed by: INTERNAL MEDICINE

## 2024-03-25 PROCEDURE — 45385 COLONOSCOPY W/LESION REMOVAL: CPT | Performed by: INTERNAL MEDICINE

## 2024-03-25 PROCEDURE — 88305 TISSUE EXAM BY PATHOLOGIST: CPT | Mod: TC,SUR | Performed by: INTERNAL MEDICINE

## 2024-03-25 PROCEDURE — 7100000009 HC PHASE TWO TIME - INITIAL BASE CHARGE: Performed by: INTERNAL MEDICINE

## 2024-03-25 PROCEDURE — 3700000012 HC SEDATION LEVEL 5+ TIME - INITIAL 15 MINUTES 5/> YEARS: Performed by: INTERNAL MEDICINE

## 2024-03-25 PROCEDURE — 3700000013 HC SEDATION LEVEL 5+ TIME - EACH ADDITIONAL 15 MINUTES: Performed by: INTERNAL MEDICINE

## 2024-03-25 PROCEDURE — 2500000004 HC RX 250 GENERAL PHARMACY W/ HCPCS (ALT 636 FOR OP/ED): Performed by: INTERNAL MEDICINE

## 2024-03-25 PROCEDURE — 88305 TISSUE EXAM BY PATHOLOGIST: CPT | Performed by: PATHOLOGY

## 2024-03-25 PROCEDURE — 99153 MOD SED SAME PHYS/QHP EA: CPT | Performed by: INTERNAL MEDICINE

## 2024-03-25 RX ORDER — SODIUM CHLORIDE, SODIUM LACTATE, POTASSIUM CHLORIDE, CALCIUM CHLORIDE 600; 310; 30; 20 MG/100ML; MG/100ML; MG/100ML; MG/100ML
20 INJECTION, SOLUTION INTRAVENOUS CONTINUOUS
Status: DISCONTINUED | OUTPATIENT
Start: 2024-03-25 | End: 2024-03-26 | Stop reason: HOSPADM

## 2024-03-25 RX ORDER — FENTANYL CITRATE 50 UG/ML
INJECTION, SOLUTION INTRAMUSCULAR; INTRAVENOUS AS NEEDED
Status: COMPLETED | OUTPATIENT
Start: 2024-03-25 | End: 2024-03-25

## 2024-03-25 RX ORDER — MIDAZOLAM HYDROCHLORIDE 1 MG/ML
INJECTION, SOLUTION INTRAMUSCULAR; INTRAVENOUS AS NEEDED
Status: COMPLETED | OUTPATIENT
Start: 2024-03-25 | End: 2024-03-25

## 2024-03-25 RX ADMIN — FENTANYL CITRATE 50 MCG: 50 INJECTION, SOLUTION INTRAMUSCULAR; INTRAVENOUS at 15:07

## 2024-03-25 RX ADMIN — MIDAZOLAM 2 MG: 1 INJECTION INTRAMUSCULAR; INTRAVENOUS at 15:07

## 2024-03-25 RX ADMIN — MIDAZOLAM 1 MG: 1 INJECTION INTRAMUSCULAR; INTRAVENOUS at 15:09

## 2024-03-25 RX ADMIN — FENTANYL CITRATE 25 MCG: 50 INJECTION, SOLUTION INTRAMUSCULAR; INTRAVENOUS at 15:09

## 2024-03-25 ASSESSMENT — PAIN - FUNCTIONAL ASSESSMENT
PAIN_FUNCTIONAL_ASSESSMENT: 0-10

## 2024-03-25 ASSESSMENT — COLUMBIA-SUICIDE SEVERITY RATING SCALE - C-SSRS: 1. IN THE PAST MONTH, HAVE YOU WISHED YOU WERE DEAD OR WISHED YOU COULD GO TO SLEEP AND NOT WAKE UP?: NO

## 2024-03-25 ASSESSMENT — PAIN SCALES - GENERAL
PAINLEVEL_OUTOF10: 0 - NO PAIN

## 2024-03-25 NOTE — H&P
Subjective     History of Present Illness:   Amaury Sawyer is a 74 y.o. male with history of colon polyps and prostate cancer who presented for surveillance colonoscopy.  Last colonoscopy in 2017 showed hemorrhoids, erythematous mucosa in the rectum consistent with changes related to prior radiation therapy, two ascending colon tubular adenomas, and sigmoid diverticulosis.  Patient denies having any GI symptoms.    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), Other specific arthropathies, not elsewhere classified, right shoulder (05/28/2019), Other specific arthropathies, not elsewhere classified, right shoulder (04/10/2019), Other specified depressive episodes (01/08/2020), Other specified disorders of eye and adnexa (08/14/2020), Other specified soft tissue disorders (05/29/2019), Other specified soft tissue disorders (07/05/2019), Pain in right shoulder (07/01/2019), Pain in unspecified knee (08/28/2019), Personal history of other endocrine, nutritional and metabolic disease (06/28/2018), Personal history of other infectious and parasitic diseases (08/28/2019), Personal history of other specified conditions (06/19/2018), Personal history of other specified conditions (08/14/2020), Personal history of other specified conditions (07/05/2019), Personal history of other specified conditions (05/21/2015), Phlebitis and thrombophlebitis of other  sites (08/28/2019), Sciatica, right side (12/20/2017), Tinea unguium (08/14/2020), Unspecified acute conjunctivitis, left eye (08/04/2020), and Unspecified fall, initial encounter (10/06/2020).     Social History   reports that he has never smoked. He has never used smokeless tobacco.     Family History  family history is not on file.     Allergies  No Known Allergies    Medications  Current Outpatient Medications   Medication Instructions    amoxicillin (Amoxil) 500 mg capsule     fluorouracil (Efudex) 5 % cream 1 Application    hydrocodone-homatropine 5-1.5 mg/5 mL syrup 5 mL, oral, Every 4 hours PRN    ibuprofen 800 mg tablet     polyethylene glycol (Glycolax, Miralax) 17 gram/dose powder Mix 238 gms with 2 32 ounce bottles of Gatorade or Powerade, Drink per bowel prep instructions.    tadalafil (Cialis) 10 mg tablet TAKE 1 TABLET BY MOUTH 1 HOUR BEFORE ACTIVITY AS NEEDED    triamcinolone (Kenalog) 0.1 % cream 1 Application        Objective   Visit Vitals  /89   Pulse 53   Temp 36 °C (96.8 °F) (Temporal)   Resp 15        Physical Exam  General: not in acute distress  CV: regular rate and rhythm  Resp: non-labored breathing  GI: soft, active bowel sounds, non-tender to palpation, no rebound or guarding  Msk: moving all extremities   Derm: no jaundice    Labs  Lab Results   Component Value Date    WBC 8.4 02/04/2024    HGB 14.7 02/04/2024    HCT 43.1 02/04/2024    MCV 92 02/04/2024     02/04/2024     Lab Results   Component Value Date    GLUCOSE 129 (H) 02/04/2024    CALCIUM 8.6 02/04/2024     02/04/2024    K 3.9 02/04/2024    CO2 23 02/04/2024     02/04/2024    BUN 18 02/04/2024    CREATININE 0.83 02/04/2024     Lab Results   Component Value Date    ALT 9 (L) 02/04/2024    AST 18 02/04/2024    ALKPHOS 55 02/04/2024    BILITOT 0.8 02/04/2024     Lab Results   Component Value Date    INR 1.0 12/18/2022    INR 0.9 04/25/2019    PROTIME 11.4 12/18/2022    PROTIME 10.4 04/25/2019        Assessment/Plan   Amaury Sawyer is a 74 y.o. male with history of colon polyps and prostate cancer who presented for surveillance colonoscopy.  Last colonoscopy in 2017 showed hemorrhoids, erythematous mucosa in the rectum consistent with changes related to prior radiation therapy, two ascending colon tubular adenomas, and sigmoid diverticulosis.  Patient denies having any GI symptoms.        Matthew Luis MD

## 2024-03-25 NOTE — DISCHARGE INSTRUCTIONS
During the first 24 hours after your procedure, you should:    - Resume normal diet, unless otherwise directed by your doctor.  - Resume your home medications, unless otherwise directed by your doctor.  - Refrain from driving or operative heavy machinery.  - Drink plenty of liquids.  - Avoid consuming alcohol.  - Avoid strenuous activity or heavy lifting.    After 24 hours, you can resume regular activity.    Call your doctor office immediately ([phone]) or come to the nearest emergency room if you experience:    - Abdominal tenderness  - Blood in your stool or vomit  - Difficulty urinating or passing stools  - Difficulty breathing  - Chest pain  - Fever

## 2024-03-26 PROBLEM — S22.39XA CLOSED FRACTURE OF ONE RIB: Status: RESOLVED | Noted: 2024-03-26 | Resolved: 2024-03-26

## 2024-03-26 PROBLEM — N52.9 MALE ERECTILE DISORDER: Status: RESOLVED | Noted: 2024-03-26 | Resolved: 2024-03-26

## 2024-03-26 PROBLEM — S06.0XAA CONCUSSION: Status: RESOLVED | Noted: 2022-12-20 | Resolved: 2024-03-26

## 2024-03-26 PROBLEM — D64.9 ANEMIA: Status: RESOLVED | Noted: 2024-03-26 | Resolved: 2024-03-26

## 2024-03-26 PROBLEM — B37.9 YEAST DETECTED: Status: RESOLVED | Noted: 2024-03-26 | Resolved: 2024-03-26

## 2024-03-26 PROBLEM — L57.9 SKIN CHANGES DUE TO CHRONIC EXPOSURE TO NONIONIZING RADIATION, UNSPECIFIED: Status: RESOLVED | Noted: 2020-10-12 | Resolved: 2024-03-26
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
## 2024-03-26 PROBLEM — L57.0 ACTINIC KERATOSIS OF SCALP: Status: RESOLVED | Noted: 2017-11-13 | Resolved: 2024-03-26

## 2024-03-26 PROBLEM — R51.9 HEADACHE: Status: RESOLVED | Noted: 2024-03-26 | Resolved: 2024-03-26

## 2024-03-26 PROBLEM — D22.5 MELANOCYTIC NEVI OF TRUNK: Status: RESOLVED | Noted: 2020-10-12 | Resolved: 2024-03-26

## 2024-03-26 PROBLEM — M19.90 UNSPECIFIED OSTEOARTHRITIS, UNSPECIFIED SITE: Status: RESOLVED | Noted: 2022-12-20 | Resolved: 2024-03-26

## 2024-03-26 PROBLEM — C44.41 BASAL CELL CARCINOMA (BCC) OF SCALP: Status: RESOLVED | Noted: 2023-02-21 | Resolved: 2024-03-26

## 2024-03-26 PROBLEM — R63.5 WEIGHT GAIN: Status: RESOLVED | Noted: 2024-03-26 | Resolved: 2024-03-26

## 2024-03-26 PROBLEM — E78.5 HYPERLIPIDEMIA: Status: RESOLVED | Noted: 2024-03-26 | Resolved: 2024-03-26

## 2024-03-26 PROBLEM — L82.1 OTHER SEBORRHEIC KERATOSIS: Status: RESOLVED | Noted: 2017-11-13 | Resolved: 2024-03-26

## 2024-03-26 PROBLEM — D22.39 MELANOCYTIC NEVI OF OTHER PARTS OF FACE: Status: RESOLVED | Noted: 2022-08-18 | Resolved: 2024-03-26

## 2024-03-26 PROBLEM — I71.21 ASCENDING AORTIC ANEURYSM (CMS-HCC): Status: ACTIVE | Noted: 2024-03-26

## 2024-03-26 PROBLEM — K63.5 COLON POLYP: Status: RESOLVED | Noted: 2024-03-26 | Resolved: 2024-03-26

## 2024-03-26 PROBLEM — H52.4 PRESBYOPIA: Status: RESOLVED | Noted: 2024-03-26 | Resolved: 2024-03-26

## 2024-03-26 PROBLEM — H57.89 INFLAMMATORY DISORDER OF EYE: Status: RESOLVED | Noted: 2024-03-26 | Resolved: 2024-03-26

## 2024-03-26 PROBLEM — R55 SYNCOPE: Status: RESOLVED | Noted: 2024-03-26 | Resolved: 2024-03-26

## 2024-03-26 PROBLEM — E78.00 ELEVATED LDL CHOLESTEROL LEVEL: Status: RESOLVED | Noted: 2024-03-26 | Resolved: 2024-03-26

## 2024-03-26 PROBLEM — C61 PROSTATE CANCER (MULTI): Status: RESOLVED | Noted: 2022-12-20 | Resolved: 2024-03-26

## 2024-03-26 PROBLEM — D18.01 HEMANGIOMA OF SKIN AND SUBCUTANEOUS TISSUE: Status: RESOLVED | Noted: 2022-08-18 | Resolved: 2024-03-26

## 2024-03-26 PROBLEM — M79.642 PAIN IN LEFT HAND: Status: RESOLVED | Noted: 2023-08-30 | Resolved: 2024-03-26

## 2024-03-26 PROBLEM — M12.811 ROTATOR CUFF ARTHROPATHY OF RIGHT SHOULDER: Status: RESOLVED | Noted: 2024-03-26 | Resolved: 2024-03-26

## 2024-03-26 PROBLEM — E55.9 VITAMIN D DEFICIENCY: Status: RESOLVED | Noted: 2024-03-26 | Resolved: 2024-03-26

## 2024-03-26 PROBLEM — D22.60 MELANOCYTIC NEVI OF UNSPECIFIED UPPER LIMB, INCLUDING SHOULDER: Status: RESOLVED | Noted: 2022-08-18 | Resolved: 2024-03-26

## 2024-03-26 PROBLEM — C44.519 BASAL CELL CARCINOMA OF SKIN OF OTHER PART OF TRUNK: Status: RESOLVED | Noted: 2023-05-09 | Resolved: 2024-03-26

## 2024-03-26 PROBLEM — I80.8 SUPERFICIAL THROMBOPHLEBITIS OF RIGHT UPPER EXTREMITY: Status: RESOLVED | Noted: 2024-03-26 | Resolved: 2024-03-26

## 2024-03-26 PROBLEM — D48.5 NEOPLASM OF UNCERTAIN BEHAVIOR OF SKIN: Status: RESOLVED | Noted: 2020-10-12 | Resolved: 2024-03-26

## 2024-03-26 PROBLEM — L81.4 OTHER MELANIN HYPERPIGMENTATION: Status: RESOLVED | Noted: 2020-10-12 | Resolved: 2024-03-26

## 2024-03-26 PROBLEM — D22.70 MELANOCYTIC NEVI OF UNSPECIFIED LOWER LIMB, INCLUDING HIP: Status: RESOLVED | Noted: 2022-08-18 | Resolved: 2024-03-26

## 2024-03-26 PROBLEM — E78.5 HYPERLIPIDEMIA: Status: RESOLVED | Noted: 2022-12-20 | Resolved: 2024-03-26
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
## 2024-03-26 PROBLEM — K57.32 DIVERTICULITIS OF COLON: Status: RESOLVED | Noted: 2024-03-26 | Resolved: 2024-03-26

## 2024-03-26 PROBLEM — D04.5 CARCINOMA IN SITU OF SKIN OF TRUNK: Status: RESOLVED | Noted: 2023-05-09 | Resolved: 2024-03-26

## 2024-03-26 RX ORDER — FLUCONAZOLE 200 MG/1
200 TABLET ORAL DAILY
COMMUNITY
Start: 2020-02-12

## 2024-03-26 RX ORDER — MULTIVITAMIN
1 TABLET ORAL DAILY
COMMUNITY

## 2024-03-26 RX ORDER — VENLAFAXINE 37.5 MG/1
37.5 TABLET ORAL
COMMUNITY
Start: 2019-11-12

## 2024-03-26 RX ORDER — CHOLECALCIFEROL (VITAMIN D3) 50 MCG
2000 TABLET ORAL DAILY
COMMUNITY
Start: 2018-06-28

## 2024-03-26 RX ORDER — SILDENAFIL 50 MG/1
50 TABLET, FILM COATED ORAL AS NEEDED
COMMUNITY
Start: 2020-07-22

## 2024-03-26 RX ORDER — FLUOXETINE HYDROCHLORIDE 20 MG/1
20 CAPSULE ORAL DAILY
COMMUNITY
Start: 2015-05-21

## 2024-03-27 ENCOUNTER — OFFICE VISIT (OUTPATIENT)
Dept: CARDIAC SURGERY | Facility: HOSPITAL | Age: 74
End: 2024-03-27
Payer: MEDICARE

## 2024-03-27 VITALS
HEIGHT: 69 IN | SYSTOLIC BLOOD PRESSURE: 111 MMHG | HEART RATE: 60 BPM | WEIGHT: 150 LBS | DIASTOLIC BLOOD PRESSURE: 70 MMHG | OXYGEN SATURATION: 98 % | BODY MASS INDEX: 22.22 KG/M2

## 2024-03-27 DIAGNOSIS — I79.0 ANEURYSM OF AORTA IN DISEASES CLASSIFIED ELSEWHERE (CMS-HCC): Primary | ICD-10-CM

## 2024-03-27 PROCEDURE — 1126F AMNT PAIN NOTED NONE PRSNT: CPT | Performed by: THORACIC SURGERY (CARDIOTHORACIC VASCULAR SURGERY)

## 2024-03-27 PROCEDURE — 1036F TOBACCO NON-USER: CPT | Performed by: THORACIC SURGERY (CARDIOTHORACIC VASCULAR SURGERY)

## 2024-03-27 PROCEDURE — 1159F MED LIST DOCD IN RCRD: CPT | Performed by: THORACIC SURGERY (CARDIOTHORACIC VASCULAR SURGERY)

## 2024-03-27 PROCEDURE — 99205 OFFICE O/P NEW HI 60 MIN: CPT | Performed by: THORACIC SURGERY (CARDIOTHORACIC VASCULAR SURGERY)

## 2024-03-27 PROCEDURE — 99215 OFFICE O/P EST HI 40 MIN: CPT | Performed by: THORACIC SURGERY (CARDIOTHORACIC VASCULAR SURGERY)

## 2024-03-27 ASSESSMENT — PAIN SCALES - GENERAL: PAINLEVEL: 0-NO PAIN

## 2024-03-27 NOTE — PROGRESS NOTES
This is a 74 years old male patient very active and very healthy otherwise.  He woke up with some numbness on right arm and he went to the emergency room.  He was worked up with a CT scan and he was found incidentally with a 5 cm ascending aorta.  So he was sent to me today for evaluation.  The patient has no significant history or family history of aortic disease.  He is actually pretty active today.  He is in very good shape.  I have evaluated his CT scan on a measure of 5 cm ascending aorta and also the length from the aortic annulus to the takeoff of the innominate trunk is 13 cm which put him on a high risk for rupture or any aortic complication.  I had a long conversation with the patient about recommending surgery.  I have explained the situation, risk and benefits we also talk about the procedure and the recovery time.  The patient agreed to proceed with an operation so we will schedule him for preoperative testing like coronary angiogram, echocardiogram and some others.  Soon as we finished with the preoperative study we will then schedule him for surgery.

## 2024-03-31 DIAGNOSIS — I71.21 AORTIC ROOT ANEURYSM (CMS-HCC): ICD-10-CM

## 2024-03-31 DIAGNOSIS — I35.0 NONRHEUMATIC AORTIC (VALVE) STENOSIS: ICD-10-CM

## 2024-03-31 DIAGNOSIS — T82.9XXA DISORDER OF PROSTHETIC AORTIC VALVE: ICD-10-CM

## 2024-03-31 DIAGNOSIS — I77.810 ASCENDING AORTA DILATION (CMS-HCC): ICD-10-CM

## 2024-04-01 ENCOUNTER — DOCUMENTATION (OUTPATIENT)
Dept: PRIMARY CARE | Facility: CLINIC | Age: 74
End: 2024-04-01
Payer: MEDICARE

## 2024-04-01 ENCOUNTER — HOSPITAL ENCOUNTER (OUTPATIENT)
Facility: HOSPITAL | Age: 74
Setting detail: SURGERY ADMIT
End: 2024-04-01
Attending: THORACIC SURGERY (CARDIOTHORACIC VASCULAR SURGERY) | Admitting: THORACIC SURGERY (CARDIOTHORACIC VASCULAR SURGERY)
Payer: MEDICARE

## 2024-04-01 DIAGNOSIS — I71.21 ASCENDING AORTIC ANEURYSM, UNSPECIFIED WHETHER RUPTURED (CMS-HCC): Primary | ICD-10-CM

## 2024-04-01 LAB
LABORATORY COMMENT REPORT: NORMAL
PATH REPORT.FINAL DX SPEC: NORMAL
PATH REPORT.GROSS SPEC: NORMAL
PATH REPORT.TOTAL CANCER: NORMAL

## 2024-04-01 NOTE — PROGRESS NOTES
I spoke with patient after reviewing the notes scans regarding his aortic aneurysm thoracic.  Patient will be getting a second opinion at [facility] which I think is a good idea.  I have asked him to call me after that visit

## 2024-04-02 ENCOUNTER — DOCUMENTATION (OUTPATIENT)
Dept: CARDIOLOGY | Facility: HOSPITAL | Age: 74
End: 2024-04-02
Payer: MEDICARE

## 2024-04-02 NOTE — PROGRESS NOTES
TITAN Registry eligibility criteria     Patient ID   : [id number]                           Site number [id number]    Inclusion Criteria   Patients >=18 years of age Patients with ascending aortic aneurysm >=5.0cm who are either not eligible or not willing to participate in the TITAN Randomized Controlled Trial     Exclusion Criteria   Patients who are unable to give informed consent    Patients who are unable to attend for regular follow-up/ remain compliant with protocol    Patients who will be undergoing aortic or cardiac surgery and have an ascending aortic aneurysm >=5.5cm       The above Subject has meet all the inclusion and Exclusion criteria.

## 2024-04-04 DIAGNOSIS — R68.89 ABNORMAL FINDING ON SCREENING PROCEDURE: Primary | ICD-10-CM

## 2024-04-04 DIAGNOSIS — K21.9 GASTROESOPHAGEAL REFLUX DISEASE WITHOUT ESOPHAGITIS: ICD-10-CM

## 2024-04-08 ENCOUNTER — HOSPITAL ENCOUNTER (OUTPATIENT)
Dept: CARDIOLOGY | Facility: HOSPITAL | Age: 74
Discharge: HOME | End: 2024-04-08
Payer: MEDICARE

## 2024-04-08 DIAGNOSIS — I71.21 ANEURYSM OF ASCENDING AORTA WITHOUT RUPTURE (CMS-HCC): Primary | ICD-10-CM

## 2024-04-08 DIAGNOSIS — I35.0 NONRHEUMATIC AORTIC (VALVE) STENOSIS: ICD-10-CM

## 2024-04-08 DIAGNOSIS — I77.810 ASCENDING AORTA DILATION (CMS-HCC): ICD-10-CM

## 2024-04-08 PROCEDURE — 2500000004 HC RX 250 GENERAL PHARMACY W/ HCPCS (ALT 636 FOR OP/ED): Performed by: STUDENT IN AN ORGANIZED HEALTH CARE EDUCATION/TRAINING PROGRAM

## 2024-04-08 PROCEDURE — 93306 TTE W/DOPPLER COMPLETE: CPT

## 2024-04-08 RX ADMIN — PERFLUTREN 2.5 ML OF DILUTION: 6.52 INJECTION, SUSPENSION INTRAVENOUS at 12:35

## 2024-04-09 LAB
AORTIC VALVE PEAK VELOCITY: 1.25 M/S
AV PEAK GRADIENT: 6.3 MMHG
EJECTION FRACTION APICAL 4 CHAMBER: 64.7
LEFT ATRIUM VOLUME AREA LENGTH INDEX BSA: 30.8 ML/M2
LEFT VENTRICLE INTERNAL DIMENSION DIASTOLE: 4.15 CM (ref 3.5–6)
LV EJECTION FRACTION BIPLANE: 69 %
MITRAL VALVE E/A RATIO: 0.7
MITRAL VALVE E/E' RATIO: 9.66
RIGHT VENTRICLE FREE WALL PEAK S': 14.5 CM/S
RIGHT VENTRICLE PEAK SYSTOLIC PRESSURE: 24 MMHG
TRICUSPID ANNULAR PLANE SYSTOLIC EXCURSION: 2.6 CM

## 2024-04-19 NOTE — RESULT ENCOUNTER NOTE
"Dear Amaury,    Of the 5 polyps that were resected during your recent colonoscopy, 4 polyps were precancerous adenomatous polyps and 1 \"polyp\" was benign mucosa.  I recommend repeat colonoscopy in 5 years if your health remains stable at that time.  Feel free to contact my office if you have any questions.    Sincerely,  Matthew Luis MD"

## 2024-05-07 ENCOUNTER — TELEPHONE (OUTPATIENT)
Dept: GASTROENTEROLOGY | Facility: HOSPITAL | Age: 74
End: 2024-05-07
Payer: MEDICARE

## 2024-05-07 NOTE — TELEPHONE ENCOUNTER
Left voicemail to remind patient to read his [employer] message regarding colonoscopy pathology results and contact my office if he has any questions.

## 2024-05-09 ENCOUNTER — HOSPITAL ENCOUNTER (OUTPATIENT)
Dept: GASTROENTEROLOGY | Facility: HOSPITAL | Age: 74
Setting detail: OUTPATIENT SURGERY
Discharge: HOME | End: 2024-05-09
Payer: MEDICARE

## 2024-05-09 VITALS
BODY MASS INDEX: 22.51 KG/M2 | HEART RATE: 56 BPM | SYSTOLIC BLOOD PRESSURE: 109 MMHG | RESPIRATION RATE: 15 BRPM | DIASTOLIC BLOOD PRESSURE: 77 MMHG | HEIGHT: 69 IN | TEMPERATURE: 98.1 F | WEIGHT: 152 LBS | OXYGEN SATURATION: 96 %

## 2024-05-09 DIAGNOSIS — R68.89 ABNORMAL FINDING ON SCREENING PROCEDURE: Primary | ICD-10-CM

## 2024-05-09 PROCEDURE — 2500000004 HC RX 250 GENERAL PHARMACY W/ HCPCS (ALT 636 FOR OP/ED): Performed by: INTERNAL MEDICINE

## 2024-05-09 PROCEDURE — 87900 PHENOTYPE INFECT AGENT DRUG: CPT | Performed by: INTERNAL MEDICINE

## 2024-05-09 PROCEDURE — 7100000009 HC PHASE TWO TIME - INITIAL BASE CHARGE

## 2024-05-09 PROCEDURE — 43239 EGD BIOPSY SINGLE/MULTIPLE: CPT | Performed by: INTERNAL MEDICINE

## 2024-05-09 PROCEDURE — 3700000012 HC SEDATION LEVEL 5+ TIME - INITIAL 15 MINUTES 5/> YEARS

## 2024-05-09 PROCEDURE — 7100000010 HC PHASE TWO TIME - EACH INCREMENTAL 1 MINUTE

## 2024-05-09 PROCEDURE — 88305 TISSUE EXAM BY PATHOLOGIST: CPT | Mod: TC,SUR | Performed by: INTERNAL MEDICINE

## 2024-05-09 RX ORDER — SODIUM CHLORIDE, SODIUM LACTATE, POTASSIUM CHLORIDE, CALCIUM CHLORIDE 600; 310; 30; 20 MG/100ML; MG/100ML; MG/100ML; MG/100ML
20 INJECTION, SOLUTION INTRAVENOUS CONTINUOUS
Status: DISCONTINUED | OUTPATIENT
Start: 2024-05-09 | End: 2024-05-10 | Stop reason: HOSPADM

## 2024-05-09 RX ORDER — MIDAZOLAM HYDROCHLORIDE 1 MG/ML
INJECTION, SOLUTION INTRAMUSCULAR; INTRAVENOUS AS NEEDED
Status: COMPLETED | OUTPATIENT
Start: 2024-05-09 | End: 2024-05-09

## 2024-05-09 RX ORDER — FENTANYL CITRATE 50 UG/ML
INJECTION, SOLUTION INTRAMUSCULAR; INTRAVENOUS AS NEEDED
Status: COMPLETED | OUTPATIENT
Start: 2024-05-09 | End: 2024-05-09

## 2024-05-09 RX ADMIN — MIDAZOLAM 1 MG: 1 INJECTION INTRAMUSCULAR; INTRAVENOUS at 15:53

## 2024-05-09 RX ADMIN — FENTANYL CITRATE 50 MCG: 50 INJECTION, SOLUTION INTRAMUSCULAR; INTRAVENOUS at 15:53

## 2024-05-09 ASSESSMENT — COLUMBIA-SUICIDE SEVERITY RATING SCALE - C-SSRS
1. IN THE PAST MONTH, HAVE YOU WISHED YOU WERE DEAD OR WISHED YOU COULD GO TO SLEEP AND NOT WAKE UP?: NO
6. HAVE YOU EVER DONE ANYTHING, STARTED TO DO ANYTHING, OR PREPARED TO DO ANYTHING TO END YOUR LIFE?: NO
2. HAVE YOU ACTUALLY HAD ANY THOUGHTS OF KILLING YOURSELF?: NO

## 2024-05-09 ASSESSMENT — PAIN - FUNCTIONAL ASSESSMENT
PAIN_FUNCTIONAL_ASSESSMENT: 0-10

## 2024-05-09 ASSESSMENT — PAIN SCALES - GENERAL
PAINLEVEL_OUTOF10: 0 - NO PAIN

## 2024-05-09 NOTE — NURSING NOTE
Eileen RN spoke with patient after procedure to discuss procedure results with him. Dr Gamez notified her that he would not be able to speak with pt and asked her to speak with him instead. Pt has no AVS at this time, but Eileen confirmed with him and me that she would send him AVS tomorrow. Pt education on sedation discharge instructions and verbalized understanding. Pt instructed to call tomorrow if AVS not received. Dr Gamez office number, GI suite phone number, and manager, Noa's phone number all provided to patient to call with any questions. Pt verbalized understanding and comfort with the situation.

## 2024-05-09 NOTE — H&P
Subjective     History of Present Illness:   Amaury Sawyer is a 74 y.o. male who presents to endoscopy    Physical Exam  General: not in acute distress  CV: regular rate and rhythm  Resp: non-labored breathing

## 2024-05-16 LAB
LAB AP ASR DISCLAIMER: NORMAL
LABORATORY COMMENT REPORT: NORMAL
PATH REPORT.COMMENTS IMP SPEC: NORMAL
PATH REPORT.FINAL DX SPEC: NORMAL
PATH REPORT.GROSS SPEC: NORMAL
PATH REPORT.TOTAL CANCER: NORMAL

## 2024-05-22 LAB
ELECTRONICALLY SIGNED BY: NORMAL
H. PYLORI DRUG SUSCEPTIBILITY RESULTS: NORMAL

## 2024-05-23 DIAGNOSIS — K29.70 HELICOBACTER POSITIVE GASTRITIS: Primary | ICD-10-CM

## 2024-05-23 DIAGNOSIS — B96.81 HELICOBACTER POSITIVE GASTRITIS: Primary | ICD-10-CM

## 2024-06-12 ENCOUNTER — TELEPHONE (OUTPATIENT)
Dept: PRIMARY CARE | Facility: CLINIC | Age: 74
End: 2024-06-12
Payer: MEDICARE

## 2024-06-18 ENCOUNTER — TELEPHONE (OUTPATIENT)
Dept: PRIMARY CARE | Facility: CLINIC | Age: 74
End: 2024-06-18
Payer: MEDICARE

## 2024-06-19 ENCOUNTER — OFFICE VISIT (OUTPATIENT)
Dept: PRIMARY CARE | Facility: CLINIC | Age: 74
End: 2024-06-19
Payer: MEDICARE

## 2024-06-19 VITALS
WEIGHT: 149.91 LBS | OXYGEN SATURATION: 98 % | DIASTOLIC BLOOD PRESSURE: 64 MMHG | HEART RATE: 50 BPM | BODY MASS INDEX: 22.14 KG/M2 | SYSTOLIC BLOOD PRESSURE: 110 MMHG

## 2024-06-19 DIAGNOSIS — F32.A DEPRESSION, UNSPECIFIED DEPRESSION TYPE: ICD-10-CM

## 2024-06-19 DIAGNOSIS — R53.83 FATIGUE, UNSPECIFIED TYPE: ICD-10-CM

## 2024-06-19 DIAGNOSIS — R19.7 DIARRHEA, UNSPECIFIED TYPE: Primary | ICD-10-CM

## 2024-06-19 ASSESSMENT — PATIENT HEALTH QUESTIONNAIRE - PHQ9
1. LITTLE INTEREST OR PLEASURE IN DOING THINGS: NOT AT ALL
SUM OF ALL RESPONSES TO PHQ9 QUESTIONS 1 AND 2: 0
2. FEELING DOWN, DEPRESSED OR HOPELESS: NOT AT ALL

## 2024-06-19 ASSESSMENT — ENCOUNTER SYMPTOMS
OCCASIONAL FEELINGS OF UNSTEADINESS: 0
LOSS OF SENSATION IN FEET: 0
DEPRESSION: 0

## 2024-06-19 NOTE — PROGRESS NOTES
Subjective   Patient ID: Amaury Sawyer is a 74 y.o. male who presents for fatigue. Started with a cough, headache and fatigue Friday. Covid test negative. No interventions.   Requesting referral for therapy, struggling with some depression. Has been on meds in the past, nothing worked. Prefers therapy route at this point. Also looking into meditation.   HPI     Review of Systems    Objective   /64   Pulse 50   Wt 68 kg (149 lb 14.6 oz)   SpO2 98%   BMI 22.14 kg/m²     Physical Exam  Constitutional:       Appearance: Normal appearance.   HENT:      Right Ear: Tympanic membrane, ear canal and external ear normal.      Left Ear: Tympanic membrane, ear canal and external ear normal.      Nose: Nose normal.      Mouth/Throat:      Mouth: Mucous membranes are moist.      Pharynx: Oropharynx is clear.   Eyes:      Conjunctiva/sclera: Conjunctivae normal.      Pupils: Pupils are equal, round, and reactive to light.   Cardiovascular:      Rate and Rhythm: Normal rate and regular rhythm.   Pulmonary:      Effort: Pulmonary effort is normal.   Neurological:      General: No focal deficit present.      Mental Status: He is alert and oriented to person, place, and time.   Psychiatric:         Mood and Affect: Mood normal.         Behavior: Behavior normal.         Assessment/Plan     Viral URI: recommend OTC mucinex DM. Advil/tylenol PRN headache. Increase rest and fluids. FU If symptoms not improved over the next week   Depression: referral to psychology as requested. Pt not interested in medication at this time.

## 2024-06-25 ENCOUNTER — APPOINTMENT (OUTPATIENT)
Dept: PRIMARY CARE | Facility: CLINIC | Age: 74
End: 2024-06-25
Payer: MEDICARE

## 2024-06-25 ENCOUNTER — LAB (OUTPATIENT)
Dept: LAB | Facility: LAB | Age: 74
End: 2024-06-25
Payer: MEDICARE

## 2024-06-25 VITALS
WEIGHT: 154 LBS | SYSTOLIC BLOOD PRESSURE: 138 MMHG | HEART RATE: 55 BPM | DIASTOLIC BLOOD PRESSURE: 91 MMHG | BODY MASS INDEX: 22.74 KG/M2 | OXYGEN SATURATION: 96 %

## 2024-06-25 DIAGNOSIS — I71.21 ANEURYSM OF ASCENDING AORTA WITHOUT RUPTURE (CMS-HCC): ICD-10-CM

## 2024-06-25 DIAGNOSIS — F33.9 EPISODE OF RECURRENT MAJOR DEPRESSIVE DISORDER, UNSPECIFIED DEPRESSION EPISODE SEVERITY (CMS-HCC): ICD-10-CM

## 2024-06-25 DIAGNOSIS — R53.83 OTHER FATIGUE: ICD-10-CM

## 2024-06-25 DIAGNOSIS — R53.83 OTHER FATIGUE: Primary | ICD-10-CM

## 2024-06-25 DIAGNOSIS — E03.9 HYPOTHYROIDISM, UNSPECIFIED TYPE: ICD-10-CM

## 2024-06-25 LAB — TSH SERPL-ACNC: 5.2 MIU/L (ref 0.44–3.98)

## 2024-06-25 ASSESSMENT — PATIENT HEALTH QUESTIONNAIRE - PHQ9
1. LITTLE INTEREST OR PLEASURE IN DOING THINGS: NOT AT ALL
SUM OF ALL RESPONSES TO PHQ9 QUESTIONS 1 AND 2: 2
2. FEELING DOWN, DEPRESSED OR HOPELESS: MORE THAN HALF THE DAYS
10. IF YOU CHECKED OFF ANY PROBLEMS, HOW DIFFICULT HAVE THESE PROBLEMS MADE IT FOR YOU TO DO YOUR WORK, TAKE CARE OF THINGS AT HOME, OR GET ALONG WITH OTHER PEOPLE: NOT DIFFICULT AT ALL

## 2024-06-25 ASSESSMENT — ENCOUNTER SYMPTOMS
OCCASIONAL FEELINGS OF UNSTEADINESS: 0
LOSS OF SENSATION IN FEET: 0
DEPRESSION: 0
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
## 2024-06-26 ENCOUNTER — TELEPHONE (OUTPATIENT)
Dept: GASTROENTEROLOGY | Facility: HOSPITAL | Age: 74
End: 2024-06-26
Payer: MEDICARE

## 2024-06-26 DIAGNOSIS — E03.9 HYPOTHYROIDISM, UNSPECIFIED TYPE: Primary | ICD-10-CM

## 2024-06-26 LAB — THYROPEROXIDASE AB SERPL-ACNC: 46 IU/ML

## 2024-06-27 RX ORDER — LEVOTHYROXINE SODIUM 25 UG/1
25 TABLET ORAL DAILY
Qty: 30 TABLET | Refills: 11 | Status: SHIPPED | OUTPATIENT
Start: 2024-06-27 | End: 2025-06-27

## 2024-06-28 ENCOUNTER — TELEPHONE (OUTPATIENT)
Dept: ENDOCRINOLOGY | Facility: CLINIC | Age: 74
End: 2024-06-28
Payer: MEDICARE

## 2024-07-01 DIAGNOSIS — K29.70 HELICOBACTER POSITIVE GASTRITIS: Primary | ICD-10-CM

## 2024-07-01 DIAGNOSIS — B96.81 HELICOBACTER POSITIVE GASTRITIS: Primary | ICD-10-CM

## 2024-07-01 RX ORDER — TETRACYCLINE HYDROCHLORIDE 500 MG/1
500 CAPSULE ORAL 4 TIMES DAILY
Qty: 56 CAPSULE | Refills: 0 | Status: SHIPPED | OUTPATIENT
Start: 2024-07-01 | End: 2024-07-15

## 2024-07-01 RX ORDER — METRONIDAZOLE 500 MG/1
250 TABLET ORAL 4 TIMES DAILY
Qty: 28 TABLET | Refills: 0 | Status: SHIPPED | OUTPATIENT
Start: 2024-07-01 | End: 2024-07-15

## 2024-07-01 RX ORDER — BISMUTH SUBSALICYLATE 262 MG/1
524 TABLET ORAL
Qty: 112 TABLET | Refills: 0 | Status: SHIPPED | OUTPATIENT
Start: 2024-07-01 | End: 2024-07-15

## 2024-07-05 ENCOUNTER — CLINICAL SUPPORT (OUTPATIENT)
Dept: SLEEP MEDICINE | Facility: CLINIC | Age: 74
End: 2024-07-05
Payer: MEDICARE

## 2024-07-05 ENCOUNTER — APPOINTMENT (OUTPATIENT)
Dept: BEHAVIORAL HEALTH | Facility: CLINIC | Age: 74
End: 2024-07-05
Payer: MEDICARE

## 2024-07-05 DIAGNOSIS — F41.9 ANXIETY: ICD-10-CM

## 2024-07-05 DIAGNOSIS — F32.A DEPRESSION, UNSPECIFIED DEPRESSION TYPE: ICD-10-CM

## 2024-07-05 DIAGNOSIS — R53.83 OTHER FATIGUE: ICD-10-CM

## 2024-07-05 DIAGNOSIS — F12.20 MARIJUANA DEPENDENCE (MULTI): ICD-10-CM

## 2024-07-05 NOTE — PROGRESS NOTES
Type of Study: HOME SLEEP STUDY - NOMAD     The patient received equipment and instructions for use of the [employer] [facility] Nomad HSAT device. The patient was instructed how to apply the effort belts, cannula, thermistor. It was also explained how the Nomad and oximeter components work.  The patient was asked to record their sleep for an 8-hour period.     The patient was informed of their responsibility for the device and acknowledged this by signing the HSAT device contract. The patient was asked to return the device on 7/8/2024 between the hours of 8:00 am-4:00 pm to the Sleep Center.     The patient was instructed to call 911 as usual for any medical- emergencies while at home.  The patient was also given a phone number for troubleshooting when using the device in case there were additional questions.
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
## 2024-07-10 ENCOUNTER — TELEPHONE (OUTPATIENT)
Dept: PRIMARY CARE | Facility: CLINIC | Age: 74
End: 2024-07-10
Payer: MEDICARE

## 2024-07-11 ENCOUNTER — DOCUMENTATION (OUTPATIENT)
Dept: PRIMARY CARE | Facility: CLINIC | Age: 74
End: 2024-07-11
Payer: MEDICARE

## 2024-07-11 NOTE — PROGRESS NOTES
I spoke with Severo he is now living at his studio he does not have a home is having trouble with a new phone.  He did get antibiotics from GI and is taking them now we talked about his abnormal sleep study and he will call and make an appointment with sleep medicine.  He is seeing mental health.  He did give me permission to speak with his son JOSSELIN

## 2024-07-19 ENCOUNTER — SOCIAL WORK (OUTPATIENT)
Dept: BEHAVIORAL HEALTH | Facility: CLINIC | Age: 74
End: 2024-07-19
Payer: MEDICARE

## 2024-07-19 ENCOUNTER — APPOINTMENT (OUTPATIENT)
Dept: BEHAVIORAL HEALTH | Facility: CLINIC | Age: 74
End: 2024-07-19
Payer: MEDICARE

## 2024-07-19 DIAGNOSIS — F12.20 MARIJUANA DEPENDENCE (MULTI): ICD-10-CM

## 2024-07-19 DIAGNOSIS — F33.9 EPISODE OF RECURRENT MAJOR DEPRESSIVE DISORDER, UNSPECIFIED DEPRESSION EPISODE SEVERITY (CMS-HCC): ICD-10-CM

## 2024-07-19 DIAGNOSIS — F31.9 BIPOLAR AND RELATED DISORDER (MULTI): ICD-10-CM

## 2024-07-19 DIAGNOSIS — F32.A DEPRESSION, UNSPECIFIED DEPRESSION TYPE: ICD-10-CM

## 2024-07-19 DIAGNOSIS — F41.9 ANXIETY: ICD-10-CM

## 2024-07-19 SDOH — ECONOMIC STABILITY: TRANSPORTATION INSECURITY
IN THE PAST 12 MONTHS, HAS LACK OF TRANSPORTATION KEPT YOU FROM MEETINGS, WORK, OR FROM GETTING THINGS NEEDED FOR DAILY LIVING?: NO

## 2024-07-19 SDOH — ECONOMIC STABILITY: INCOME INSECURITY: IN THE LAST 12 MONTHS, WAS THERE A TIME WHEN YOU WERE NOT ABLE TO PAY THE MORTGAGE OR RENT ON TIME?: NO

## 2024-07-19 SDOH — ECONOMIC STABILITY: FOOD INSECURITY: WITHIN THE PAST 12 MONTHS, THE FOOD YOU BOUGHT JUST DIDN'T LAST AND YOU DIDN'T HAVE MONEY TO GET MORE.: NEVER TRUE

## 2024-07-19 SDOH — ECONOMIC STABILITY: TRANSPORTATION INSECURITY
IN THE PAST 12 MONTHS, HAS THE LACK OF TRANSPORTATION KEPT YOU FROM MEDICAL APPOINTMENTS OR FROM GETTING MEDICATIONS?: NO

## 2024-07-19 SDOH — ECONOMIC STABILITY: FOOD INSECURITY: WITHIN THE PAST 12 MONTHS, YOU WORRIED THAT YOUR FOOD WOULD RUN OUT BEFORE YOU GOT MONEY TO BUY MORE.: NEVER TRUE

## 2024-07-19 ASSESSMENT — SOCIAL DETERMINANTS OF HEALTH (SDOH)
HOW OFTEN DO YOU GET TOGETHER WITH FRIENDS OR RELATIVES?: ONCE A WEEK
HOW OFTEN DO YOU ATTEND CHURCH OR RELIGIOUS SERVICES?: MORE THAN 4 TIMES PER YEAR
IN A TYPICAL WEEK, HOW MANY TIMES DO YOU TALK ON THE PHONE WITH FAMILY, FRIENDS, OR NEIGHBORS?: ONCE A WEEK
IN THE PAST 12 MONTHS, HAS THE ELECTRIC, GAS, OIL, OR WATER COMPANY THREATENED TO SHUT OFF SERVICE IN YOUR HOME?: NO
DO YOU BELONG TO ANY CLUBS OR ORGANIZATIONS SUCH AS CHURCH GROUPS UNIONS, FRATERNAL OR ATHLETIC GROUPS, OR SCHOOL GROUPS?: YES
HOW OFTEN DO YOU ATTEND CHURCH OR RELIGIOUS SERVICES?: MORE THAN 4 TIMES PER YEAR
HOW OFTEN DO YOU ATTENT MEETINGS OF THE CLUB OR ORGANIZATION YOU BELONG TO?: MORE THAN 4 TIMES PER YEAR
HOW OFTEN DO YOU ATTENT MEETINGS OF THE CLUB OR ORGANIZATION YOU BELONG TO?: MORE THAN 4 TIMES PER YEAR
DO YOU BELONG TO ANY CLUBS OR ORGANIZATIONS SUCH AS CHURCH GROUPS UNIONS, FRATERNAL OR ATHLETIC GROUPS, OR SCHOOL GROUPS?: YES
WITHIN THE LAST YEAR, HAVE YOU BEEN AFRAID OF YOUR PARTNER OR EX-PARTNER?: NO
IN A TYPICAL WEEK, HOW MANY TIMES DO YOU TALK ON THE PHONE WITH FAMILY, FRIENDS, OR NEIGHBORS?: ONCE A WEEK
WITHIN THE LAST YEAR, HAVE YOU BEEN HUMILIATED OR EMOTIONALLY ABUSED IN OTHER WAYS BY YOUR PARTNER OR EX-PARTNER?: NO
HOW HARD IS IT FOR YOU TO PAY FOR THE VERY BASICS LIKE FOOD, HOUSING, MEDICAL CARE, AND HEATING?: NOT HARD AT ALL
HOW OFTEN DO YOU GET TOGETHER WITH FRIENDS OR RELATIVES?: ONCE A WEEK
WITHIN THE LAST YEAR, HAVE YOU BEEN KICKED, HIT, SLAPPED, OR OTHERWISE PHYSICALLY HURT BY YOUR PARTNER OR EX-PARTNER?: NO
WITHIN THE LAST YEAR, HAVE TO BEEN RAPED OR FORCED TO HAVE ANY KIND OF SEXUAL ACTIVITY BY YOUR PARTNER OR EX-PARTNER?: NO

## 2024-07-19 ASSESSMENT — LIFESTYLE VARIABLES
HOW OFTEN DO YOU HAVE A DRINK CONTAINING ALCOHOL: 2-4 TIMES A MONTH
SKIP TO QUESTIONS 9-10: 1
HOW MANY STANDARD DRINKS CONTAINING ALCOHOL DO YOU HAVE ON A TYPICAL DAY: 1 OR 2
AUDIT-C TOTAL SCORE: 2
HOW OFTEN DO YOU HAVE SIX OR MORE DRINKS ON ONE OCCASION: NEVER

## 2024-07-19 NOTE — PSYCHOTHERAPY
"Staying at the [employer] studio on 2nd floor, no one knows  Son finds it upsetting - goal to get something permanent  To [place]  He's flexible, volunteered on hospital ship  He has other options if needed  Its easy this way, son doesn't know, likes the setting  [employer]    Seeking help - younger son depends on him to take care of dog  \"I could have this\", chose not to  His sons - see him a floating homeless person  No one wants to hear his feelings, told he lives in the past    Doesn't like who he is  Uses marijuana as an antidepressant  Raised 2 sons, worked at [employer] for 23 years  In environment w/ people have lots of money  Smart and rich people - make him uncomfortable    No antidepressants work  Normally docile, withdrawn  Sons don't like him smoking dope    Has a grandson    Values -  His sons and families - both in [facility] and 2 grandsons, Alfie Mcclendon in [place]  Being an artist    Oldest son is a   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 was was an artist, eccentric    Son is an artist  Proud of sons  Shares studio space    Goals::   1.Give up need to smoke marijuana    Open to [facility]  Used to teach at [employer]  Went to [place] or 2 weeks, travelled with the students  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 Sister denies    Agrees w/ referral to ARS program                                                                                        "

## 2024-07-19 NOTE — PROGRESS NOTES
"Therapy Session  Progress Note    Session Type: Virtual    Start Time: 9:00 am  End Time: 9:55 am    Appointment: Scheduled    Reason for Visit: Depression, Anxiety, Marijuana Dependence    Patient Symptoms/Interval History: Trying to curb some of his marijuana use, aware that his sons don't approve of this. Wonders why he has difficulty moving on from traumatic events in his past. Uses marijuana to feel better about himself, but realizes there are consequences in his family life. Values his relationships with his sons and grandsons. Questioning his purpose at this point of his life. Has a strong interest in spirituality. Has never been through a rehab program but is open to considering attending the ARS program. Currently \"homeless\" again and staying in his son's art studio, despite having money to get a place of his own.     Mental Status: Alert & Oriented x3    Functional Status: Moderate impairment    Interventions Provided: Values Exploration, Motivational Interviewing    Progress Made: Minimum    Response to Interventions: The patient was receptive to the interventions provided.    Action Plan/Homework: A referral will be made to the ARS program. The patient will be leaving for a month-long trip to [place] on August 2, but he would be interested in starting that process prior to leaving.    Treatment Plan: Use Solution-Focused Therapy to help the patient cope with his feelings of depression and marijuana dependence. Continue to encourage the patient to attend a rehab program.    Follow Up/Next Appointment: Follow up in 2 weeks.      MITUL Garcia, MILES    **This visit was performed using real-time telehealth tools, including an audio/video connection between the patient and this provider. The patient provided consent for the individual telemedicine service and understands the limitations of the visit.  "
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
## 2024-07-29 ENCOUNTER — APPOINTMENT (OUTPATIENT)
Dept: BEHAVIORAL HEALTH | Facility: CLINIC | Age: 74
End: 2024-07-29
Payer: MEDICARE

## 2024-07-31 ENCOUNTER — APPOINTMENT (OUTPATIENT)
Dept: BEHAVIORAL HEALTH | Facility: CLINIC | Age: 74
End: 2024-07-31
Payer: MEDICARE

## 2024-09-09 PROBLEM — I25.10 CORONARY ARTERY CALCIFICATION SEEN ON CT SCAN: Status: ACTIVE | Noted: 2024-04-12

## 2024-09-09 PROBLEM — H25.13 AGE-RELATED NUCLEAR CATARACT, BILATERAL: Status: ACTIVE | Noted: 2024-09-09

## 2024-09-09 PROBLEM — I77.810 AORTIC ROOT DILATION (CMS-HCC): Status: ACTIVE | Noted: 2024-03-26

## 2024-09-09 RX ORDER — TERBINAFINE HYDROCHLORIDE 250 MG/1
TABLET ORAL
COMMUNITY
Start: 2024-07-29

## 2024-09-17 PROBLEM — F41.8 MIXED ANXIETY AND DEPRESSIVE DISORDER: Status: ACTIVE | Noted: 2024-09-17

## 2024-09-24 ENCOUNTER — TELEMEDICINE (OUTPATIENT)
Dept: BEHAVIORAL HEALTH | Facility: CLINIC | Age: 74
End: 2024-09-24
Payer: MEDICARE

## 2024-09-24 DIAGNOSIS — F31.9 BIPOLAR AND RELATED DISORDER (MULTI): ICD-10-CM

## 2024-09-24 RX ORDER — LITHIUM CARBONATE 300 MG
300 TABLET ORAL NIGHTLY
Qty: 30 TABLET | Refills: 1 | Status: SHIPPED | OUTPATIENT
Start: 2024-09-24 | End: 2024-11-23

## 2024-09-24 ASSESSMENT — ENCOUNTER SYMPTOMS: DEPRESSED MOOD: 1

## 2024-09-24 NOTE — PROGRESS NOTES
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 fragments of tan, soft tissue                           aggregating to 1.0 x 0.2 x 0.2 cm.  The specimen is admixed with organic                           material.  The specimen is submitted in toto in one cassette.                          [facility]                                                    Summary of Cassettes:                          Label Site                          1 soft tissue                          2 organic material                          B: Received in formalin, labeled with the patient's name and hospital                           number and \"2, transverse biopsy\", are multiple fragments of tan, soft                           tissue aggregating to 1.2 x 0.4 x 0.2 cm. The specimen is submitted in                           toto in one cassette.                          [facility]                          C: Received in formalin, labeled with the patient's name and hospital                           number and \"3, sigmoid biopsy\", is a fragment of tan, soft tissue                           measuring 0.4 x 0.3 x 0.2 cm. The specimen is submitted in toto in one                           cassette.                          [facility]       Assessment/Plan   Psychiatric Risk Assessment  Violence Risk Assessment: none  Acute Risk of Harm to Others is Considered: low   Suicide Risk " Assessment: age > 65 yrs old and   Protective Factors against Suicide: adherence to  treatment, fear of suicide, moral objections to suicide, positive family relationships, and sense of responsibility toward family  Acute Risk of Harm to Self is Considered: low    Imminent Risk of Suicide or Serious Self-Injury: Low   Chronic Risk of Suicide of Serious Self-Injury: Low  Risk factors: Age, depression history and   Protective factors: Denies current suicidal ideation, denies history of suicide attempts , willingness to seek help and support , gender, access to a variety of clinical interventions , and receiving and engaged in care for mental, physical, and substance use disorders      Imminent Risk of Violence or Homicide: Low   Risk Factors: No significant risk factors identified on screening  Protective Factors: Lack of known history of harm to others , Lack of known history of violent ideation , and lack of known access to firearms.     Assessment & Plan  Bipolar and related disorder (Multi)    Orders:    lithium 300 mg tablet; Take 1 tablet (300 mg) by mouth once daily at bedtime.    Referral to Psychology; Future    Follow Up In Psychiatry; Future    Follow up in 2 to 3 weeks.   We did make a referral to psychology for psychotherapy.

## 2024-09-24 NOTE — ASSESSMENT & PLAN NOTE
Orders:    lithium 300 mg tablet; Take 1 tablet (300 mg) by mouth once daily at bedtime.    Referral to Psychology; Future    Follow Up In Psychiatry; Future

## 2024-09-27 ENCOUNTER — APPOINTMENT (OUTPATIENT)
Dept: BEHAVIORAL HEALTH | Facility: CLINIC | Age: 74
End: 2024-09-27
Payer: MEDICARE

## 2024-10-07 ENCOUNTER — APPOINTMENT (OUTPATIENT)
Dept: BEHAVIORAL HEALTH | Facility: CLINIC | Age: 74
End: 2024-10-07
Payer: MEDICARE

## 2024-10-16 ENCOUNTER — APPOINTMENT (OUTPATIENT)
Dept: BEHAVIORAL HEALTH | Facility: CLINIC | Age: 74
End: 2024-10-16
Payer: MEDICARE

## 2024-12-30 ENCOUNTER — APPOINTMENT (OUTPATIENT)
Dept: PRIMARY CARE | Facility: CLINIC | Age: 74
End: 2024-12-30
Payer: MEDICARE

## 2024-12-30 ENCOUNTER — LAB (OUTPATIENT)
Dept: LAB | Facility: LAB | Age: 74
End: 2024-12-30
Payer: MEDICARE

## 2024-12-30 VITALS
SYSTOLIC BLOOD PRESSURE: 127 MMHG | DIASTOLIC BLOOD PRESSURE: 77 MMHG | RESPIRATION RATE: 16 BRPM | WEIGHT: 161.6 LBS | HEART RATE: 52 BPM | HEIGHT: 69 IN | OXYGEN SATURATION: 96 % | BODY MASS INDEX: 23.93 KG/M2

## 2024-12-30 DIAGNOSIS — I77.810 AORTIC ROOT DILATION (CMS-HCC): ICD-10-CM

## 2024-12-30 DIAGNOSIS — Z12.5 PROSTATE CANCER SCREENING: Primary | ICD-10-CM

## 2024-12-30 DIAGNOSIS — E03.9 HYPOTHYROIDISM, UNSPECIFIED TYPE: ICD-10-CM

## 2024-12-30 DIAGNOSIS — C61 MALIGNANT NEOPLASM OF PROSTATE (MULTI): ICD-10-CM

## 2024-12-30 DIAGNOSIS — Z12.5 PROSTATE CANCER SCREENING: ICD-10-CM

## 2024-12-30 LAB
PSA SERPL-MCNC: <0.1 NG/ML
T4 FREE SERPL-MCNC: 1.14 NG/DL (ref 0.78–1.48)
TSH SERPL-ACNC: 4.85 MIU/L (ref 0.44–3.98)

## 2024-12-30 PROCEDURE — 1159F MED LIST DOCD IN RCRD: CPT | Performed by: INTERNAL MEDICINE

## 2024-12-30 PROCEDURE — 1170F FXNL STATUS ASSESSED: CPT | Performed by: INTERNAL MEDICINE

## 2024-12-30 PROCEDURE — 84443 ASSAY THYROID STIM HORMONE: CPT

## 2024-12-30 PROCEDURE — 1160F RVW MEDS BY RX/DR IN RCRD: CPT | Performed by: INTERNAL MEDICINE

## 2024-12-30 PROCEDURE — 3008F BODY MASS INDEX DOCD: CPT | Performed by: INTERNAL MEDICINE

## 2024-12-30 PROCEDURE — 84439 ASSAY OF FREE THYROXINE: CPT

## 2024-12-30 PROCEDURE — G0439 PPPS, SUBSEQ VISIT: HCPCS | Performed by: INTERNAL MEDICINE

## 2024-12-30 PROCEDURE — 1036F TOBACCO NON-USER: CPT | Performed by: INTERNAL MEDICINE

## 2024-12-30 PROCEDURE — 99214 OFFICE O/P EST MOD 30 MIN: CPT | Performed by: INTERNAL MEDICINE

## 2024-12-30 PROCEDURE — G0103 PSA SCREENING: HCPCS

## 2024-12-30 RX ORDER — ATORVASTATIN CALCIUM 20 MG/1
20 TABLET, FILM COATED ORAL
COMMUNITY
Start: 2024-10-24

## 2024-12-30 ASSESSMENT — ENCOUNTER SYMPTOMS
BACK PAIN: 0
UNEXPECTED WEIGHT CHANGE: 0
SHORTNESS OF BREATH: 0
ABDOMINAL DISTENTION: 0
OCCASIONAL FEELINGS OF UNSTEADINESS: 0
GASTROINTESTINAL NEGATIVE: 1
LOSS OF SENSATION IN FEET: 0
PALPITATIONS: 0
DIFFICULTY URINATING: 0
ENDOCRINE NEGATIVE: 1
DEPRESSION: 1

## 2024-12-30 ASSESSMENT — ACTIVITIES OF DAILY LIVING (ADL)
DRESSING: INDEPENDENT
DOING_HOUSEWORK: INDEPENDENT
MANAGING_FINANCES: INDEPENDENT
BATHING: INDEPENDENT
GROCERY_SHOPPING: INDEPENDENT
TAKING_MEDICATION: INDEPENDENT

## 2024-12-30 ASSESSMENT — PATIENT HEALTH QUESTIONNAIRE - PHQ9
3. TROUBLE FALLING OR STAYING ASLEEP OR SLEEPING TOO MUCH: NEARLY EVERY DAY
2. FEELING DOWN, DEPRESSED OR HOPELESS: NEARLY EVERY DAY
SUM OF ALL RESPONSES TO PHQ9 QUESTIONS 1 AND 2: 3
6. FEELING BAD ABOUT YOURSELF - OR THAT YOU ARE A FAILURE OR HAVE LET YOURSELF OR YOUR FAMILY DOWN: NEARLY EVERY DAY
5. POOR APPETITE OR OVEREATING: NOT AT ALL
10. IF YOU CHECKED OFF ANY PROBLEMS, HOW DIFFICULT HAVE THESE PROBLEMS MADE IT FOR YOU TO DO YOUR WORK, TAKE CARE OF THINGS AT HOME, OR GET ALONG WITH OTHER PEOPLE: NOT DIFFICULT AT ALL
4. FEELING TIRED OR HAVING LITTLE ENERGY: MORE THAN HALF THE DAYS
1. LITTLE INTEREST OR PLEASURE IN DOING THINGS: NOT AT ALL
9. THOUGHTS THAT YOU WOULD BE BETTER OFF DEAD, OR OF HURTING YOURSELF: SEVERAL DAYS
8. MOVING OR SPEAKING SO SLOWLY THAT OTHER PEOPLE COULD HAVE NOTICED. OR THE OPPOSITE, BEING SO FIGETY OR RESTLESS THAT YOU HAVE BEEN MOVING AROUND A LOT MORE THAN USUAL: NOT AT ALL
7. TROUBLE CONCENTRATING ON THINGS, SUCH AS READING THE NEWSPAPER OR WATCHING TELEVISION: NEARLY EVERY DAY
SUM OF ALL RESPONSES TO PHQ QUESTIONS 1-9: 15

## 2024-12-30 NOTE — PROGRESS NOTES
"Subjective   Reason for Visit: Amaury Sawyer is an 74 y.o. male here for a Medicare Wellness visit.     Past Medical, Surgical, and Family History reviewed and updated in chart.    Reviewed all medications by prescribing practitioner or clinical pharmacist (such as prescriptions, OTCs, herbal therapies and supplements) and documented in the medical record.    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   HENT: Negative.     Eyes:  Negative for visual disturbance.   Respiratory:  Negative for shortness of breath.    Cardiovascular:  Negative for chest pain, palpitations and leg swelling.   Gastrointestinal: Negative.  Negative for abdominal distention.   Endocrine: Negative.    Genitourinary:  Negative for difficulty urinating.   Musculoskeletal:  Negative for back pain.   Skin:  Negative for rash.       Objective   Vitals:  /77   Pulse 52   Resp 16   Ht 1.753 m (5' 9\")   Wt 73.3 kg (161 lb 9.6 oz)   SpO2 96%   BMI 23.86 kg/m²       Physical Exam  Constitutional:       Appearance: Normal appearance. He is normal weight.   Eyes:      General: No scleral icterus.  Neck:      Vascular: No carotid bruit.   Cardiovascular:      Rate and Rhythm: Regular rhythm.      Heart sounds: Normal heart sounds. No murmur heard.  Pulmonary:      Breath sounds: Normal breath sounds.   Abdominal:      General: Abdomen is flat. Bowel sounds are normal.      Palpations: Abdomen is soft.      Tenderness: There is no abdominal tenderness. "   Musculoskeletal:      Cervical back: No rigidity or tenderness.      Right lower leg: No edema.      Left lower leg: No edema.   Lymphadenopathy:      Cervical: No cervical adenopathy.   Neurological:      Mental Status: He is alert and oriented to person, place, and time.      Gait: Gait normal.      Deep Tendon Reflexes: Reflexes normal.   Psychiatric:         Thought Content: Thought content normal.         Assessment/Plan   Diagnoses and all orders for this visit:  Prostate cancer screening  -     PSA; Future  Hypothyroidism, unspecified type  -     Tsh With Reflex To Free T4 If Abnormal; Future  Malignant neoplasm of prostate (Multi)  As follow-up your prostate cancer we will do a PSA  Aortic root dilation (CMS-HCC)  You are seeing cardiology [facility] you have a follow-up 4 months  You have had COVID shot and flu shot good job follow-up with me annually   Bipolar you controlling it with marijuana this works better for you you prefer not to go on lithium

## 2024-12-30 NOTE — PROGRESS NOTES
"Subjective   Reason for Visit: Amaury Sawyer is an 74 y.o. male here for a Medicare Wellness visit.     Past Medical, Surgical, and Family History reviewed and updated in chart.    Reviewed all medications by prescribing practitioner or clinical pharmacist (such as prescriptions, OTCs, herbal therapies and supplements) and documented in the medical record.    HPI    Patient Care Team:  Ben Amezquita MD as PCP - General  Ben Amezquita MD as PCP - [employer] PCP  John Gamez MD as Surgeon (Gastroenterology)     Review of Systems    Objective   Vitals:  /77   Pulse 52   Resp 16   Ht 1.753 m (5' 9\")   Wt 73.3 kg (161 lb 9.6 oz)   SpO2 96%   BMI 23.86 kg/m²       Physical Exam    Assessment & Plan              "

## 2025-05-23 ENCOUNTER — TELEPHONE (OUTPATIENT)
Dept: PRIMARY CARE | Facility: CLINIC | Age: 75
End: 2025-05-23
Payer: MEDICARE

## 2025-05-27 ENCOUNTER — APPOINTMENT (OUTPATIENT)
Dept: PRIMARY CARE | Facility: CLINIC | Age: 75
End: 2025-05-27
Payer: MEDICARE

## 2025-05-27 VITALS
RESPIRATION RATE: 21 BRPM | BODY MASS INDEX: 23.99 KG/M2 | SYSTOLIC BLOOD PRESSURE: 112 MMHG | HEART RATE: 59 BPM | OXYGEN SATURATION: 96 % | DIASTOLIC BLOOD PRESSURE: 60 MMHG | HEIGHT: 69 IN | WEIGHT: 162 LBS

## 2025-05-27 DIAGNOSIS — E13.49 OTHER DIABETIC NEUROLOGICAL COMPLICATION ASSOCIATED WITH OTHER SPECIFIED DIABETES MELLITUS: ICD-10-CM

## 2025-05-27 DIAGNOSIS — G62.89 OTHER POLYNEUROPATHY: Primary | ICD-10-CM

## 2025-05-27 PROCEDURE — 1159F MED LIST DOCD IN RCRD: CPT

## 2025-05-27 PROCEDURE — 1036F TOBACCO NON-USER: CPT

## 2025-05-27 PROCEDURE — 3078F DIAST BP <80 MM HG: CPT

## 2025-05-27 PROCEDURE — 3074F SYST BP LT 130 MM HG: CPT

## 2025-05-27 PROCEDURE — 99213 OFFICE O/P EST LOW 20 MIN: CPT

## 2025-05-27 RX ORDER — DULOXETIN HYDROCHLORIDE 30 MG/1
30 CAPSULE, DELAYED RELEASE ORAL DAILY
Qty: 30 CAPSULE | Refills: 0 | Status: SHIPPED | OUTPATIENT
Start: 2025-05-27 | End: 2025-06-26

## 2025-05-27 ASSESSMENT — ENCOUNTER SYMPTOMS
WEAKNESS: 1
NUMBNESS: 1

## 2025-05-27 NOTE — PROGRESS NOTES
"Subjective   Patient ID: Amaury Sawyer is a 75 y.o. male who presents for Numbness (Numbness in right hand for one year-increasing in frequency lately ).    HPI   Amaury Sawyer is a 75 y.o. male who presents for Numbness (Numbness in right hand for one year-increasing in frequency lately ).patient was complaining tingling , numbness and weakness in right hand and forearm which sometimes moved towards arm also. When first happened, in 2024 he went to [facility] , labs and imaging done, CT head without contrast excluded any stroke , CT cervical spine showed no acute fracture or traumatic subluxation of the cervical spine other than degenerative changes without critical canal stenosis.  CTA showed Aneurysmal ascending thoracic aorta measuring up to 4.7 cm , for which she follows with vascular.  He denied any fever, chill, vision problem, lightheadedness, dizziness, pain in hands or weakness anywhere in the body.  Pt was a ceramic teacher x 26 yrs, currently he is a .     Review of Systems   Neurological:  Positive for weakness and numbness.        Tingling in right forearm and hand   All other systems reviewed and are negative.      Objective   /60 (BP Location: Left arm, Patient Position: Sitting, BP Cuff Size: Adult)   Pulse 59   Resp 21   Ht 1.753 m (5' 9\")   Wt 73.5 kg (162 lb)   SpO2 96%   BMI 23.92 kg/m²     Physical Exam  Musculoskeletal:         General: No swelling or tenderness. Normal range of motion.   Neurological:      Comments: Phalen sign and Tinel signs are negative in right hand.  Sensation intact  Motor 5 out of 5 in right hand.  Reflexes intact         Assessment/Plan   Assessment & Plan  Other polyneuropathy  - Diabetic status unknown, but symptoms unusual with diabetic neuropathy is only unilateral  - CT cervical spine showed no acute fracture or traumatic subluxation of the cervical spine other than degenerative changes without critical canal stenosis(2024)  - " History not suggestive toward carpal tunnel syndrome, also physical exam negative for Phalen's sign and Tinel's sign.   - Can be due to degenerative changes in the cervical spine  -Ordered EMG and referral sent to neurology  -Also ordered CMP, B12, folate and hemoglobin A1c  -Started Cymbalta 30 mg nightly  -Follow-up with me after neurologist visit.  Orders:    EMG & nerve conduction; Future    Comprehensive metabolic panel; Future    DULoxetine (Cymbalta) 30 mg DR capsule; Take 1 capsule (30 mg) by mouth once daily. Do not crush or chew.    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

## 2025-05-30 ENCOUNTER — TELEPHONE (OUTPATIENT)
Dept: PRIMARY CARE | Facility: CLINIC | Age: 75
End: 2025-05-30
Payer: MEDICARE

## 2025-06-09 ENCOUNTER — TELEPHONE (OUTPATIENT)
Dept: PRIMARY CARE | Facility: CLINIC | Age: 75
End: 2025-06-09
Payer: MEDICARE

## 2025-06-10 ENCOUNTER — HOSPITAL ENCOUNTER (EMERGENCY)
Facility: HOSPITAL | Age: 75
Discharge: HOME | End: 2025-06-10
Attending: GENERAL PRACTICE
Payer: MEDICARE

## 2025-06-10 ENCOUNTER — OFFICE VISIT (OUTPATIENT)
Dept: PRIMARY CARE | Facility: CLINIC | Age: 75
End: 2025-06-10
Payer: MEDICARE

## 2025-06-10 ENCOUNTER — APPOINTMENT (OUTPATIENT)
Dept: RADIOLOGY | Facility: HOSPITAL | Age: 75
End: 2025-06-10
Payer: MEDICARE

## 2025-06-10 VITALS
HEART RATE: 56 BPM | WEIGHT: 155 LBS | TEMPERATURE: 97.8 F | DIASTOLIC BLOOD PRESSURE: 84 MMHG | HEIGHT: 68 IN | OXYGEN SATURATION: 99 % | SYSTOLIC BLOOD PRESSURE: 154 MMHG | BODY MASS INDEX: 23.49 KG/M2 | RESPIRATION RATE: 20 BRPM

## 2025-06-10 VITALS
RESPIRATION RATE: 22 BRPM | DIASTOLIC BLOOD PRESSURE: 68 MMHG | SYSTOLIC BLOOD PRESSURE: 106 MMHG | BODY MASS INDEX: 23.99 KG/M2 | HEIGHT: 69 IN | OXYGEN SATURATION: 98 % | HEART RATE: 60 BPM | WEIGHT: 162 LBS

## 2025-06-10 DIAGNOSIS — R19.7 ACUTE DIARRHEA: Primary | ICD-10-CM

## 2025-06-10 DIAGNOSIS — R19.7 DIARRHEA, UNSPECIFIED TYPE: Primary | ICD-10-CM

## 2025-06-10 LAB
ALBUMIN SERPL BCP-MCNC: 3.9 G/DL (ref 3.4–5)
ALP SERPL-CCNC: 52 U/L (ref 33–136)
ALT SERPL W P-5'-P-CCNC: 13 U/L (ref 10–52)
ANION GAP SERPL CALC-SCNC: 11 MMOL/L (ref 10–20)
AST SERPL W P-5'-P-CCNC: 22 U/L (ref 9–39)
BASOPHILS # BLD AUTO: 0.04 X10*3/UL (ref 0–0.1)
BASOPHILS NFR BLD AUTO: 0.7 %
BILIRUB SERPL-MCNC: 0.6 MG/DL (ref 0–1.2)
BUN SERPL-MCNC: 13 MG/DL (ref 6–23)
CALCIUM SERPL-MCNC: 8.7 MG/DL (ref 8.6–10.3)
CHLORIDE SERPL-SCNC: 106 MMOL/L (ref 98–107)
CO2 SERPL-SCNC: 24 MMOL/L (ref 21–32)
CREAT SERPL-MCNC: 0.76 MG/DL (ref 0.5–1.3)
EGFRCR SERPLBLD CKD-EPI 2021: >90 ML/MIN/1.73M*2
EOSINOPHIL # BLD AUTO: 0.2 X10*3/UL (ref 0–0.4)
EOSINOPHIL NFR BLD AUTO: 3.5 %
ERYTHROCYTE [DISTWIDTH] IN BLOOD BY AUTOMATED COUNT: 12.8 % (ref 11.5–14.5)
GLUCOSE SERPL-MCNC: 71 MG/DL (ref 74–99)
HCT VFR BLD AUTO: 43 % (ref 41–52)
HEMOCCULT SP1 STL QL: NEGATIVE
HGB BLD-MCNC: 14.7 G/DL (ref 13.5–17.5)
IMM GRANULOCYTES # BLD AUTO: 0.02 X10*3/UL (ref 0–0.5)
IMM GRANULOCYTES NFR BLD AUTO: 0.4 % (ref 0–0.9)
LIPASE SERPL-CCNC: 34 U/L (ref 9–82)
LYMPHOCYTES # BLD AUTO: 1.39 X10*3/UL (ref 0.8–3)
LYMPHOCYTES NFR BLD AUTO: 24.6 %
MAGNESIUM SERPL-MCNC: 2.09 MG/DL (ref 1.6–2.4)
MCH RBC QN AUTO: 31.3 PG (ref 26–34)
MCHC RBC AUTO-ENTMCNC: 34.2 G/DL (ref 32–36)
MCV RBC AUTO: 92 FL (ref 80–100)
MONOCYTES # BLD AUTO: 0.76 X10*3/UL (ref 0.05–0.8)
MONOCYTES NFR BLD AUTO: 13.5 %
NEUTROPHILS # BLD AUTO: 3.23 X10*3/UL (ref 1.6–5.5)
NEUTROPHILS NFR BLD AUTO: 57.3 %
NRBC BLD-RTO: 0 /100 WBCS (ref 0–0)
PLATELET # BLD AUTO: 217 X10*3/UL (ref 150–450)
POTASSIUM SERPL-SCNC: 4.5 MMOL/L (ref 3.5–5.3)
PROT SERPL-MCNC: 6.3 G/DL (ref 6.4–8.2)
RBC # BLD AUTO: 4.7 X10*6/UL (ref 4.5–5.9)
SODIUM SERPL-SCNC: 136 MMOL/L (ref 136–145)
WBC # BLD AUTO: 5.6 X10*3/UL (ref 4.4–11.3)

## 2025-06-10 PROCEDURE — 74177 CT ABD & PELVIS W/CONTRAST: CPT

## 2025-06-10 PROCEDURE — 83735 ASSAY OF MAGNESIUM: CPT | Performed by: GENERAL PRACTICE

## 2025-06-10 PROCEDURE — 2550000001 HC RX 255 CONTRASTS: Performed by: GENERAL PRACTICE

## 2025-06-10 PROCEDURE — 80053 COMPREHEN METABOLIC PANEL: CPT | Performed by: GENERAL PRACTICE

## 2025-06-10 PROCEDURE — 87506 IADNA-DNA/RNA PROBE TQ 6-11: CPT | Mod: AHULAB | Performed by: GENERAL PRACTICE

## 2025-06-10 PROCEDURE — 87493 C DIFF AMPLIFIED PROBE: CPT | Mod: AHULAB | Performed by: GENERAL PRACTICE

## 2025-06-10 PROCEDURE — 82270 OCCULT BLOOD FECES: CPT | Performed by: GENERAL PRACTICE

## 2025-06-10 PROCEDURE — 99213 OFFICE O/P EST LOW 20 MIN: CPT

## 2025-06-10 PROCEDURE — 83690 ASSAY OF LIPASE: CPT | Performed by: EMERGENCY MEDICINE

## 2025-06-10 PROCEDURE — 99285 EMERGENCY DEPT VISIT HI MDM: CPT | Mod: 25 | Performed by: GENERAL PRACTICE

## 2025-06-10 PROCEDURE — 36415 COLL VENOUS BLD VENIPUNCTURE: CPT | Performed by: GENERAL PRACTICE

## 2025-06-10 PROCEDURE — 85025 COMPLETE CBC W/AUTO DIFF WBC: CPT | Performed by: GENERAL PRACTICE

## 2025-06-10 RX ADMIN — IOHEXOL 75 ML: 350 INJECTION, SOLUTION INTRAVENOUS at 15:30

## 2025-06-10 ASSESSMENT — PAIN - FUNCTIONAL ASSESSMENT: PAIN_FUNCTIONAL_ASSESSMENT: 0-10

## 2025-06-10 ASSESSMENT — COLUMBIA-SUICIDE SEVERITY RATING SCALE - C-SSRS
6. HAVE YOU EVER DONE ANYTHING, STARTED TO DO ANYTHING, OR PREPARED TO DO ANYTHING TO END YOUR LIFE?: NO
2. HAVE YOU ACTUALLY HAD ANY THOUGHTS OF KILLING YOURSELF?: NO
1. IN THE PAST MONTH, HAVE YOU WISHED YOU WERE DEAD OR WISHED YOU COULD GO TO SLEEP AND NOT WAKE UP?: NO

## 2025-06-10 ASSESSMENT — PAIN SCALES - GENERAL: PAINLEVEL_OUTOF10: 5 - MODERATE PAIN

## 2025-06-10 NOTE — DISCHARGE INSTRUCTIONS
You were seen in the emergency room today for evaluation of loose stools.  Your imaging showed some retained colonic stool.  An enema was performed and there was a significant amount of stool output that was brown in color.  Your labs were overall reassuring.  Please follow-up with gastroenterology and primary care provider as discussed.  Please return if you have recurrent symptoms worsening symptoms or any other concerns.

## 2025-06-10 NOTE — PROGRESS NOTES
"Subjective   Patient ID: Amaury Sawyer is a 75 y.o. male who presents for Diarrhea (Diarrhea for 11 days).    HPI   Amaury Sawyer is a 75 y.o. male who presents for Diarrhea (Diarrhea for 11 days).  Patient admitted he started having explosive diarrhea 2 hours after taking Cymbalta which is 11 days ago.  Diarrhea is still continuing, every day he has at least 10 times.  Stool is dark-colored and foul-smelling.  He stopped taking Cymbalta 8 days after starting but still having diarrhea 10 times a day.  Today morning before coming to the office he had 5 times, he is also experiencing lightheadedness, dizziness and headache.  He was taking over-the-counter electrolyte supplementation for diarrhea.  He denied taking any raw food, any outside diner.  He lives alone, cooks his food but mostly prepared.  He denies any nausea or vomiting but mild abdominal pain.  He tried over-the-counter Imodium which is helping little.     Review of Systems   Constitutional:  Positive for fatigue.   Gastrointestinal:  Positive for diarrhea (Dark color).   Neurological:  Positive for dizziness, light-headedness and headaches.   All other systems reviewed and are negative.      Objective   /68 (BP Location: Left arm, Patient Position: Sitting)   Pulse 60   Resp 22   Ht 1.753 m (5' 9\")   Wt 73.5 kg (162 lb)   SpO2 98%   BMI 23.92 kg/m²     Physical Exam  Vitals and nursing note reviewed.   Constitutional:       Comments: Tired.    HENT:      Mouth/Throat:      Mouth: Mucous membranes are dry.   Cardiovascular:      Rate and Rhythm: Normal rate and regular rhythm.      Pulses: Normal pulses.      Heart sounds: Normal heart sounds. No murmur heard.     No friction rub. No gallop.   Pulmonary:      Effort: Pulmonary effort is normal.      Breath sounds: Normal breath sounds. No wheezing, rhonchi or rales.   Abdominal:      General: Abdomen is flat. Bowel sounds are normal. There is no distension.      Tenderness: There is " no abdominal tenderness. There is no guarding or rebound.   Musculoskeletal:      Right lower leg: No edema.      Left lower leg: No edema.   Skin:     Comments: Loose, when pulled , goes back slowly.    Neurological:      Mental Status: He is alert. Mental status is at baseline.   Psychiatric:         Mood and Affect: Mood normal.         Assessment/Plan   Assessment & Plan    # Acute diarrhea  - Likely due to Cymbalta, or any GI bleeding, infectious (unless ruled out)  - Having persistent diarrhea after starting Cymbalta  - Physical exam showed dehydrated, blood pressure is soft.  - I think patient needs some fluid resuscitation and further workup as he is mentioning passing dark stool also.  - Advised to go to the ER, but as patient was feeling dizzy, lightheadedness.  Called squad and sent him to the ER, patient agreed with that.

## 2025-06-10 NOTE — ED TRIAGE NOTES
Pt was at an outpatient apt for diarrhea x12 days and the provider noticed black/tarry stool as well as hypotension around 80/40. Pt endorses nausea and mild lower abdominal pain. Hx of bradycardia, patient is A&O x4 and not seemingly in distress

## 2025-06-10 NOTE — TELEPHONE ENCOUNTER
Spoke with GARY-patient is requesting to be seen today, patient is scheduled for 6-10-25 at 12:20pm. Patient informed

## 2025-06-10 NOTE — ED PROVIDER NOTES
HPI   Chief Complaint   Patient presents with    Black or Bloody Stool       HPI: 75-year-old male presents for diarrhea.  For the past approximately 2 weeks he has had copious amounts of black diarrhea.  He reports mild abdominal cramping and nausea.  No fever, chills, recent antibiotic use or recent travel.  He was urged to come to the ED by his primary care physician      Limitations to history: None  Independent Historians: Patient  External Records Reviewed: HIE, outpatient notes, inpatient notes  ------------------------------------------------------------------------------------------------------------------------------------------  ROS: a ten point review of systems was performed and was negative except as per HPI.  ------------------------------------------------------------------------------------------------------------------------------------------  PMH / PSH: as per HPI, otherwise reviewed in EMR  MEDS: as per HPI, otherwise reviewed in EMR  ALLERGIES: as per HPI, otherwise reviewed in EMR  SocH:  as per HPI, otherwise reviewed in EMR  FH:  as per HPI, otherwise reviewed in EMR  ------------------------------------------------------------------------------------------------------------------------------------------  Physical Exam:  VS: As documented in the triage note and EMR flowsheet from this visit was reviewed  General: Well appearing. No acute distress.   Eyes:  Extraocular movements grossly intact. No scleral icterus. No discharge  HEENT:  Normocephalic.  Atraumatic  Neck: Moves neck freely. No gross masses  CV: Regular rhythm. No murmurs, rubs or gallops   Resp: Clear to auscultation bilaterally. No respiratory distress.    GI: Soft, no masses, nontender. No rebound tenderness or guarding  MSK: Symmetric muscle bulk. No deformities. No lower extremity edema.    Skin: Warm, dry, intact.   Neuro: No focal deficits.  A&O x3.   Psych: Appropriate for  situation  ------------------------------------------------------------------------------------------------------------------------------------------  Hospital Course / Medical Decision Making:  Independent Interpretations: N/A  EKG as interpreted by me: N/A    MDM: 75-year-old male presents for black diarrhea.  I performed a rectal exam and noted no rectal bleeding or dark stool.  He is well-appearing.  No major abnormalities on CBC or CMP.  Patient was signed out to the oncoming provider pending results of an occult fecal blood test and CT and disposition.      Discussion of Management with Other Providers:   I discussed the patient/results with: Emergency medicine team    Final diagnosis and disposition as below.    Results for orders placed or performed during the hospital encounter of 06/10/25  -CBC and Auto Differential:   Collection Time: 06/10/25  1:55 PM       Result                      Value             Ref Range           WBC                         5.6               4.4 - 11.3 x*       nRBC                        0.0               0.0 - 0.0 /1*       RBC                         4.70              4.50 - 5.90 *       Hemoglobin                  14.7              13.5 - 17.5 *       Hematocrit                  43.0              41.0 - 52.0 %       MCV                         92                80 - 100 fL         MCH                         31.3              26.0 - 34.0 *       MCHC                        34.2              32.0 - 36.0 *       RDW                         12.8              11.5 - 14.5 %       Platelets                   217               150 - 450 x1*       Neutrophils %               57.3              40.0 - 80.0 %       Immature Granulocytes *     0.4               0.0 - 0.9 %         Lymphocytes %               24.6              13.0 - 44.0 %       Monocytes %                 13.5              2.0 - 10.0 %        Eosinophils %               3.5               0.0 - 6.0 %         Basophils %                  0.7               0.0 - 2.0 %         Neutrophils Absolute        3.23              1.60 - 5.50 *       Immature Granulocytes *     0.02              0.00 - 0.50 *       Lymphocytes Absolute        1.39              0.80 - 3.00 *       Monocytes Absolute          0.76              0.05 - 0.80 *       Eosinophils Absolute        0.20              0.00 - 0.40 *       Basophils Absolute          0.04              0.00 - 0.10 *  -Magnesium:   Collection Time: 06/10/25  1:55 PM       Result                      Value             Ref Range           Magnesium                   2.09              1.60 - 2.40 *  -Comprehensive metabolic panel:   Collection Time: 06/10/25  1:55 PM       Result                      Value             Ref Range           Glucose                     71 (L)            74 - 99 mg/dL       Sodium                      136               136 - 145 mm*       Potassium                   4.5               3.5 - 5.3 mm*       Chloride                    106               98 - 107 mmo*       Bicarbonate                 24                21 - 32 mmol*       Anion Gap                   11                10 - 20 mmol*       Urea Nitrogen               13                6 - 23 mg/dL        Creatinine                  0.76              0.50 - 1.30 *       eGFR                        >90               >60 mL/min/1*       Calcium                     8.7               8.6 - 10.3 m*       Albumin                     3.9               3.4 - 5.0 g/*       Alkaline Phosphatase        52                33 - 136 U/L        Total Protein               6.3 (L)           6.4 - 8.2 g/*       AST                         22                9 - 39 U/L          Bilirubin, Total            0.6               0.0 - 1.2 mg*       ALT                         13                10 - 52 U/L                    Patient History   Medical History[1]  Surgical History[2]  Family History[3]  Social History[4]    Physical Exam    ED Triage Vitals   Temperature Heart Rate Respirations BP   06/10/25 1353 06/10/25 1353 06/10/25 1353 06/10/25 1354   36.6 °C (97.8 °F) (!) 47 15 156/87      Pulse Ox Temp src Heart Rate Source Patient Position   06/10/25 1353 -- -- --   99 %         BP Location FiO2 (%)     -- --             Physical Exam      ED Course & MDM   ED Course as of 06/10/25 1629   [facility] 10, 2025   1621 Patient care was signed out to me pending CT read.  Briefly patient has had diarrhea for a few days.  Reportedly had some darker stools recently, no stool on rectal exam per initial provider.  Hemodynamically stable. [JM]      ED Course User Index  [JM] Tarik Mast MD                 No data recorded     Claudia Coma Scale Score: 15 (06/10/25 1351 : Kyra Kathryn Behnfeldt, RN)                           Medical Decision Making      Procedure  Procedures       [1]   Past Medical History:  Diagnosis Date    Accidental fall 03/26/2024    Actinic keratosis of scalp 11/13/2017    Activity, yoga 12/20/2022    Anemia 03/26/2024    Basal cell carcinoma (BCC) of scalp 02/21/2023    Basal cell carcinoma of skin of other part of trunk 05/09/2023    Candidiasis, unspecified 08/14/2020    Yeast detected    Carcinoma in situ of skin of trunk 05/09/2023    Closed fracture of one rib 03/26/2024    Colon polyp 03/26/2024    Concussion 12/20/2022    Diverticulitis of colon 03/26/2024    Elevated LDL cholesterol level 03/26/2024    Encounter for health counseling related to travel 09/30/2021    Counseling for travel    Encounter for immunization 09/01/2021    Encounter for immunization    Encounter for screening for malignant neoplasm of prostate 06/19/2018    Prostate cancer screening    Headache 03/26/2024    Hemangioma of skin and subcutaneous tissue 08/18/2022    Hyperlipidemia 03/26/2024    Hyperlipidemia 12/20/2022    Inflammatory disorder of eye 03/26/2024    Left lower quadrant pain 08/14/2020    LLQ discomfort    Male erectile disorder  03/26/2024    Melanocytic nevi of other parts of face 08/18/2022    Melanocytic nevi of trunk 10/12/2020    Melanocytic nevi of unspecified lower limb, including hip 08/18/2022    Melanocytic nevi of unspecified upper limb, including shoulder 08/18/2022    Neoplasm of uncertain behavior of skin 10/12/2020    Other fecal abnormalities     Loose stools    Other melanin hyperpigmentation 10/12/2020    Other postherpetic nervous system involvement 07/21/2019    Post herpetic neuralgia    Other seborrheic keratosis 11/13/2017    Other specific arthropathies, not elsewhere classified, right shoulder 05/28/2019    Rotator cuff arthropathy, right    Other specific arthropathies, not elsewhere classified, right shoulder 04/10/2019    Rotator cuff arthropathy, right    Other specified depressive episodes 01/08/2020    Other depression    Other specified disorders of eye and adnexa 08/14/2020    Irritation of left eye    Other specified soft tissue disorders 05/29/2019    Swelling of arm    Other specified soft tissue disorders 07/05/2019    Leg swelling    Pain in left hand 08/30/2023    Pain in right shoulder 07/01/2019    Shoulder pain, right    Pain in unspecified knee 08/28/2019    Joint pain, knee    Personal history of other endocrine, nutritional and metabolic disease 06/28/2018    History of vitamin D deficiency    Personal history of other infectious and parasitic diseases 08/28/2019    History of herpes zoster    Personal history of other specified conditions 06/19/2018    History of headache    Personal history of other specified conditions 08/14/2020    History of abdominal pain    Personal history of other specified conditions 07/05/2019    History of weight gain    Personal history of other specified conditions 05/21/2015    History of syncope    Phlebitis and thrombophlebitis of other sites 08/28/2019    Superficial thrombophlebitis of right upper extremity    Presbyopia 03/26/2024    Prostate cancer (Multi)  12/20/2022    Rotator cuff arthropathy of right shoulder 03/26/2024    Sciatica, right side 12/20/2017    Sciatica of right side    Skin changes due to chronic exposure to nonionizing radiation, unspecified 10/12/2020    Superficial thrombophlebitis of right upper extremity 03/26/2024    Syncope 03/26/2024    Tinea unguium 08/14/2020    Toenail fungus    Unspecified acute conjunctivitis, left eye 08/04/2020    Acute conjunctivitis of left eye, unspecified acute conjunctivitis type    Unspecified fall, initial encounter 10/06/2020    Fall, accidental    Unspecified osteoarthritis, unspecified site 12/20/2022    Vitamin D deficiency 03/26/2024    Weight gain 03/26/2024    Yeast detected 03/26/2024   [2]   Past Surgical History:  Procedure Laterality Date    OTHER SURGICAL HISTORY  05/21/2015    Total Disc Arthroplasty Removal Lumbar    OTHER SURGICAL HISTORY  09/01/2021    Prostate brachytherapy   [3] No family history on file.  [4]   Social History  Tobacco Use    Smoking status: Never    Smokeless tobacco: Never   Vaping Use    Vaping status: Never Used   Substance Use Topics    Alcohol use: Not Currently    Drug use: Yes     Types: Marijuana        Blake Alcala DO  06/10/25 0415

## 2025-06-11 LAB
C COLI+JEJ+UPSA DNA STL QL NAA+PROBE: NOT DETECTED
C DIF TOX TCDA+TCDB STL QL NAA+PROBE: NOT DETECTED
EC STX1 GENE STL QL NAA+PROBE: NOT DETECTED
EC STX2 GENE STL QL NAA+PROBE: NOT DETECTED
NOROVIRUS GI + GII RNA STL NAA+PROBE: NOT DETECTED
RV RNA STL NAA+PROBE: NOT DETECTED
SALMONELLA DNA STL QL NAA+PROBE: NOT DETECTED
SHIGELLA DNA SPEC QL NAA+PROBE: NOT DETECTED
V CHOLERAE DNA STL QL NAA+PROBE: NOT DETECTED
Y ENTEROCOL DNA STL QL NAA+PROBE: NOT DETECTED

## 2025-06-12 ENCOUNTER — OFFICE VISIT (OUTPATIENT)
Dept: GASTROENTEROLOGY | Facility: CLINIC | Age: 75
End: 2025-06-12
Payer: MEDICARE

## 2025-06-12 VITALS
SYSTOLIC BLOOD PRESSURE: 115 MMHG | BODY MASS INDEX: 24.18 KG/M2 | WEIGHT: 159 LBS | DIASTOLIC BLOOD PRESSURE: 69 MMHG | TEMPERATURE: 97.6 F | HEART RATE: 71 BPM

## 2025-06-12 DIAGNOSIS — R19.7 ACUTE DIARRHEA: Primary | ICD-10-CM

## 2025-06-12 PROCEDURE — 1126F AMNT PAIN NOTED NONE PRSNT: CPT | Performed by: NURSE PRACTITIONER

## 2025-06-12 PROCEDURE — 99213 OFFICE O/P EST LOW 20 MIN: CPT | Performed by: NURSE PRACTITIONER

## 2025-06-12 PROCEDURE — 1159F MED LIST DOCD IN RCRD: CPT | Performed by: NURSE PRACTITIONER

## 2025-06-12 RX ORDER — WHEAT DEXTRIN 5 G/7.4 G
POWDER (GRAM) ORAL
Qty: 248 G | Refills: 1 | Status: SHIPPED | OUTPATIENT
Start: 2025-06-12

## 2025-06-12 ASSESSMENT — ENCOUNTER SYMPTOMS
HEMATOLOGIC/LYMPHATIC NEGATIVE: 1
MUSCULOSKELETAL NEGATIVE: 1
DIZZINESS: 1
COUGH: 0
DIFFICULTY URINATING: 0
PSYCHIATRIC NEGATIVE: 1
CHILLS: 0
EYES NEGATIVE: 1
DEPRESSION: 1
APNEA: 0
ALLERGIC/IMMUNOLOGIC NEGATIVE: 1
FATIGUE: 1
DIARRHEA: 1
FEVER: 0
ENDOCRINE NEGATIVE: 1
CARDIOVASCULAR NEGATIVE: 1
ROS GI COMMENTS: SEE HPI
STRIDOR: 0
WHEEZING: 0
LIGHT-HEADEDNESS: 1
CHEST TIGHTNESS: 0
HEADACHES: 1
FATIGUE: 0
NEUROLOGICAL NEGATIVE: 1
RESPIRATORY NEGATIVE: 1
SHORTNESS OF BREATH: 0
DIAPHORESIS: 0

## 2025-06-12 ASSESSMENT — PAIN SCALES - GENERAL: PAINLEVEL_OUTOF10: 0-NO PAIN

## 2025-06-12 NOTE — PATIENT INSTRUCTIONS
Start benefiber one teaspoon daily with a full glass of water    I think this could have been viral    If you still have diarrhea please follow-up

## 2025-06-12 NOTE — PROGRESS NOTES
Transfer of care note:    I received this patient in signout -   ED Course as of 06/12/25 1206   e Jabari 10, 2025   1621 Patient care was signed out to me pending CT read.  Briefly patient has had diarrhea for a few days.  Reportedly had some darker stools recently, no stool on rectal exam per initial provider.  Hemodynamically stable. [JM]   1757 Shortly, patient was reportedly hypotensive at his primary care office 80s over 40s.  Apparently on EMS arrival he was hypertensive and has been hypertensive here.  I added a lipase and spoke to Dr. Laura from GI, given the colonic stool plan to perform enema and then if stool was grossly melanotic admission for scope versus if not then likely discharge. [JM]   1940 Enema was performed and patient had a large amount of light brown stool output, no melena, no blood.  Feels improved.  Cramping discomfort resolved.  Plan for GI and PCP follow-up.  Stool studies to be sent. [JM]      ED Course User Index  [JM] Tarik Mast MD         Diagnoses as of 06/12/25 1206   Diarrhea, unspecified type     Explained findings, exam/study results, the importance of follow up and the plan moving forward; patient and/or caregiver verbalized understanding and agreement. All questions were answered and no new questions at this time. Patient will be discharged with strict return precautions, follow up plan with PCP/GI as needed.

## 2025-06-12 NOTE — PROGRESS NOTES
Subjective   Patient ID: Amaury Sawyer is a 75 y.o. male who presents for Abdominal Pain and Diarrhea.    Started about 2 weeks ago  Seen in the ED on 6/10    Took a dosage of Cymbalta, developed diarrhea  He was going 5-7 times/day      Now stools have improved now 3-4 times/day. He reports stools were black and this is after taking Pepto bismol. The Pepto did not help, he started imodium but this did not help.  He had a stool PCR, C diff, occult which were negative    After eating would need to have a BM    Had headaches, upset stomach, and no fevers, weight loss, night sweats     CT showed  Arthritic changes in the spine and pelvis as described.      Left colon and sigmoid diverticulosis without acute associated  inflammation. Mild-to-moderate diffuse retained colonic stool and gas.      Previous prostate brachytherapy.      Small left lobe liver cyst.    Review of Systems   Constitutional:  Negative for chills, diaphoresis, fatigue and fever.   HENT: Negative.     Eyes: Negative.    Respiratory: Negative.  Negative for apnea, cough, chest tightness, shortness of breath, wheezing and stridor.    Cardiovascular: Negative.    Gastrointestinal:         See HPI    Endocrine: Negative.    Genitourinary: Negative.  Negative for difficulty urinating.   Musculoskeletal: Negative.    Skin: Negative.    Allergic/Immunologic: Negative.    Neurological: Negative.    Hematological: Negative.    Psychiatric/Behavioral: Negative.         Objective   Physical Exam  Constitutional:       Appearance: Normal appearance.   HENT:      Nose: Nose normal.   Eyes:      General: Lids are normal.   Cardiovascular:      Rate and Rhythm: Normal rate and regular rhythm.      Heart sounds: Normal heart sounds.   Pulmonary:      Effort: Pulmonary effort is normal.      Breath sounds: Normal breath sounds.   Abdominal:      General: Bowel sounds are normal.   Musculoskeletal:         General: Normal range of motion.   Skin:     General:  Skin is warm and dry.   Neurological:      Mental Status: He is alert and oriented to person, place, and time.   Psychiatric:         Mood and Affect: Mood normal.         Assessment/Plan   Diagnoses and all orders for this visit:  Acute diarrhea  -     wheat dextrin (Benefiber Healthy Shape) 5 gram/7.4 gram powder; Take 1 teaspoon daily     75 year old male with a PMH of bipolar, anxiety, depression who presents today for ER follow-up. Seen in the ER for diarrhea, at that time he noticed dark stools however he was using Pepto-bismol. The diarrhea has now seemed to improve without treatment and he believes it started after using Cymbalta.  He did have a CT in the ER which showed mild-to-moderate diffuse retained colonic stool and gas however it is unclear to me if this was 2/2 imodium and Pepto-bismol use. A c diff, stool culture, fecal occult, CBC were all unremarkable. Since symptoms are improving now, I suspect this could have been viral, ?overflow, less likely 2/2 Cymbalta, however microscopic colitis could be a differential given h/o NSAID use. If symptoms persist, we could proceed with a colonoscopy, as for now he will start benefiber and follow-up in 1 month.     Nori Madsen, BRANDON-CNP 06/12/25 11:15 AM

## 2025-06-30 ENCOUNTER — APPOINTMENT (OUTPATIENT)
Dept: NEUROLOGY | Facility: CLINIC | Age: 75
End: 2025-06-30
Payer: MEDICARE

## 2025-07-28 ENCOUNTER — APPOINTMENT (OUTPATIENT)
Dept: PRIMARY CARE | Facility: CLINIC | Age: 75
End: 2025-07-28
Payer: MEDICARE

## 2025-08-25 ENCOUNTER — APPOINTMENT (OUTPATIENT)
Dept: GASTROENTEROLOGY | Facility: CLINIC | Age: 75
End: 2025-08-25
Payer: MEDICARE

## 2025-08-25 VITALS — HEART RATE: 55 BPM | WEIGHT: 166 LBS | HEIGHT: 68 IN | BODY MASS INDEX: 25.16 KG/M2

## 2025-08-25 DIAGNOSIS — K22.70 BARRETT'S ESOPHAGUS WITHOUT DYSPLASIA: ICD-10-CM

## 2025-08-25 DIAGNOSIS — Z86.19 HISTORY OF HELICOBACTER PYLORI INFECTION: Primary | ICD-10-CM

## 2025-08-25 DIAGNOSIS — R19.7 DIARRHEA, UNSPECIFIED TYPE: ICD-10-CM

## 2025-08-25 PROCEDURE — 99203 OFFICE O/P NEW LOW 30 MIN: CPT

## 2025-08-25 PROCEDURE — 1036F TOBACCO NON-USER: CPT

## 2025-08-25 PROCEDURE — 1159F MED LIST DOCD IN RCRD: CPT

## 2025-08-25 RX ORDER — TERBINAFINE HYDROCHLORIDE 250 MG/1
1 TABLET ORAL
COMMUNITY
Start: 2025-07-29

## 2025-08-25 ASSESSMENT — ENCOUNTER SYMPTOMS
APPETITE CHANGE: 0
ANAL BLEEDING: 0
NAUSEA: 0
BLOOD IN STOOL: 0
FATIGUE: 0
CHILLS: 0
RECTAL PAIN: 0
ABDOMINAL DISTENTION: 0
SHORTNESS OF BREATH: 0
VOMITING: 0
CONSTIPATION: 0
FEVER: 0
ABDOMINAL PAIN: 0
TROUBLE SWALLOWING: 0
COUGH: 0
DIARRHEA: 0

## 2025-09-02 LAB — H PYLORI AG STL QL IA: NORMAL

## 2025-09-08 ENCOUNTER — APPOINTMENT (OUTPATIENT)
Dept: NEUROLOGY | Facility: CLINIC | Age: 75
End: 2025-09-08
Payer: MEDICARE

## 2026-02-19 ENCOUNTER — APPOINTMENT (OUTPATIENT)
Dept: NEUROLOGY | Facility: CLINIC | Age: 76
End: 2026-02-19
Payer: MEDICARE